# Patient Record
Sex: FEMALE | HISPANIC OR LATINO | Employment: FULL TIME | URBAN - METROPOLITAN AREA
[De-identification: names, ages, dates, MRNs, and addresses within clinical notes are randomized per-mention and may not be internally consistent; named-entity substitution may affect disease eponyms.]

---

## 2021-05-31 ENCOUNTER — OFFICE VISIT (OUTPATIENT)
Dept: URGENT CARE | Facility: URGENT CARE | Age: 26
End: 2021-05-31
Payer: MEDICAID

## 2021-05-31 VITALS
DIASTOLIC BLOOD PRESSURE: 64 MMHG | TEMPERATURE: 99 F | HEART RATE: 103 BPM | WEIGHT: 143 LBS | RESPIRATION RATE: 16 BRPM | SYSTOLIC BLOOD PRESSURE: 125 MMHG | OXYGEN SATURATION: 100 %

## 2021-05-31 DIAGNOSIS — Z11.3 SCREEN FOR STD (SEXUALLY TRANSMITTED DISEASE): Primary | ICD-10-CM

## 2021-05-31 PROCEDURE — 99000 SPECIMEN HANDLING OFFICE-LAB: CPT | Performed by: PHYSICIAN ASSISTANT

## 2021-05-31 PROCEDURE — 36415 COLL VENOUS BLD VENIPUNCTURE: CPT | Performed by: PHYSICIAN ASSISTANT

## 2021-05-31 PROCEDURE — 87591 N.GONORRHOEAE DNA AMP PROB: CPT | Performed by: PHYSICIAN ASSISTANT

## 2021-05-31 PROCEDURE — 87389 HIV-1 AG W/HIV-1&-2 AB AG IA: CPT | Performed by: PHYSICIAN ASSISTANT

## 2021-05-31 PROCEDURE — 87491 CHLMYD TRACH DNA AMP PROBE: CPT | Performed by: PHYSICIAN ASSISTANT

## 2021-05-31 PROCEDURE — 86803 HEPATITIS C AB TEST: CPT | Performed by: PHYSICIAN ASSISTANT

## 2021-05-31 PROCEDURE — 99203 OFFICE O/P NEW LOW 30 MIN: CPT | Performed by: PHYSICIAN ASSISTANT

## 2021-05-31 PROCEDURE — 86706 HEP B SURFACE ANTIBODY: CPT | Performed by: PHYSICIAN ASSISTANT

## 2021-05-31 PROCEDURE — 87340 HEPATITIS B SURFACE AG IA: CPT | Performed by: PHYSICIAN ASSISTANT

## 2021-05-31 PROCEDURE — 86780 TREPONEMA PALLIDUM: CPT | Mod: 90 | Performed by: PHYSICIAN ASSISTANT

## 2021-05-31 RX ORDER — DOXYCYCLINE 100 MG/1
TABLET ORAL
COMMUNITY
Start: 2021-02-08 | End: 2022-02-15

## 2021-05-31 NOTE — PROGRESS NOTES
Patient presents with:  STD: Pt would like STD testing done     (Z11.3) Screen for STD (sexually transmitted disease)  (primary encounter diagnosis)  Comment:   Plan: NEISSERIA GONORRHOEA PCR, CHLAMYDIA TRACHOMATIS        PCR, Hepatitis B surface antigen, Hepatitis B         Surface Antibody, Hepatitis C antibody,         Treponema Abs w Reflex to RPR and Titer, HIV         Antigen Antibody Combo              SUBJECTIVE:   Rainer Muniz is a 25 year old male who presents today for STD screening.  He is asymptomatic.          Current Outpatient Medications   Medication Sig Dispense Refill     Multiple Vitamins-Iron (DAILY-ANDREAS/IRON/BETA-CAROTENE) TABS TAKE 1 TABLET BY MOUTH DAILY. (Patient not taking: Reported on 10/19/2020) 30 tablet 7     Social History     Tobacco Use     Smoking status: Never Smoker     Smokeless tobacco: Never Used   Substance Use Topics     Alcohol use: Not on file     Family History   Problem Relation Age of Onset     Diabetes Mother      Diabetes Father          ROS:    10 point ROS of systems including Constitutional, Eyes, Respiratory, Cardiovascular, Gastroenterology, Genitourinary, Integumentary, Muscularskeletal, Psychiatric ,neurological were all negative except for pertinent positives noted in my HPI       OBJECTIVE:  /64   Pulse 103   Temp 99  F (37.2  C) (Tympanic)   Resp 16   Wt 64.9 kg (143 lb)   SpO2 100%   Physical Exam:  GENERAL APPEARANCE: healthy, alert and no distress  SKIN: no suspicious lesions or rashes

## 2021-06-02 LAB
C TRACH DNA SPEC QL NAA+PROBE: NEGATIVE
HBV SURFACE AB SERPL IA-ACNC: 5.88 M[IU]/ML
HBV SURFACE AG SERPL QL IA: NONREACTIVE
HCV AB SERPL QL IA: ABNORMAL
HIV 1+2 AB+HIV1 P24 AG SERPL QL IA: NONREACTIVE
N GONORRHOEA DNA SPEC QL NAA+PROBE: NEGATIVE
SPECIMEN SOURCE: NORMAL
SPECIMEN SOURCE: NORMAL
T PALLIDUM AB SER QL: NONREACTIVE

## 2021-06-14 ENCOUNTER — IMMUNIZATION (OUTPATIENT)
Dept: FAMILY MEDICINE | Facility: CLINIC | Age: 26
End: 2021-06-14
Payer: MEDICAID

## 2021-06-14 ENCOUNTER — OFFICE VISIT (OUTPATIENT)
Dept: FAMILY MEDICINE | Facility: CLINIC | Age: 26
End: 2021-06-14
Payer: MEDICAID

## 2021-06-14 VITALS
RESPIRATION RATE: 16 BRPM | TEMPERATURE: 97.5 F | DIASTOLIC BLOOD PRESSURE: 64 MMHG | BODY MASS INDEX: 21.52 KG/M2 | OXYGEN SATURATION: 99 % | WEIGHT: 142 LBS | HEART RATE: 87 BPM | HEIGHT: 68 IN | SYSTOLIC BLOOD PRESSURE: 110 MMHG

## 2021-06-14 DIAGNOSIS — Z11.59 NEED FOR HEPATITIS C SCREENING TEST: ICD-10-CM

## 2021-06-14 DIAGNOSIS — F43.23 ADJUSTMENT DISORDER WITH MIXED ANXIETY AND DEPRESSED MOOD: ICD-10-CM

## 2021-06-14 DIAGNOSIS — R76.8 POSITIVE HEPATITIS C ANTIBODY TEST: Primary | ICD-10-CM

## 2021-06-14 PROCEDURE — 87522 HEPATITIS C REVRS TRNSCRPJ: CPT | Performed by: FAMILY MEDICINE

## 2021-06-14 PROCEDURE — 0011A PR COVID VAC MODERNA 100 MCG/0.5 ML IM: CPT

## 2021-06-14 PROCEDURE — 86803 HEPATITIS C AB TEST: CPT | Performed by: FAMILY MEDICINE

## 2021-06-14 PROCEDURE — 96127 BRIEF EMOTIONAL/BEHAV ASSMT: CPT | Performed by: FAMILY MEDICINE

## 2021-06-14 PROCEDURE — 99213 OFFICE O/P EST LOW 20 MIN: CPT | Mod: 25 | Performed by: FAMILY MEDICINE

## 2021-06-14 PROCEDURE — 36415 COLL VENOUS BLD VENIPUNCTURE: CPT | Performed by: FAMILY MEDICINE

## 2021-06-14 PROCEDURE — 91301 PR COVID VAC MODERNA 100 MCG/0.5 ML IM: CPT

## 2021-06-14 SDOH — HEALTH STABILITY: MENTAL HEALTH: HOW OFTEN DO YOU HAVE A DRINK CONTAINING ALCOHOL?: NEVER

## 2021-06-14 ASSESSMENT — PATIENT HEALTH QUESTIONNAIRE - PHQ9
SUM OF ALL RESPONSES TO PHQ QUESTIONS 1-9: 17
SUM OF ALL RESPONSES TO PHQ QUESTIONS 1-9: 17
10. IF YOU CHECKED OFF ANY PROBLEMS, HOW DIFFICULT HAVE THESE PROBLEMS MADE IT FOR YOU TO DO YOUR WORK, TAKE CARE OF THINGS AT HOME, OR GET ALONG WITH OTHER PEOPLE: NOT DIFFICULT AT ALL

## 2021-06-14 ASSESSMENT — MIFFLIN-ST. JEOR: SCORE: 1613.61

## 2021-06-14 NOTE — PATIENT INSTRUCTIONS
Try to return for an adult preventive health visit. It would be a good idea to see how you are doing emotionally, and it would be good to check your fasting cholesterol and glucose level.   Patient Education     Understanding Adjustment Disorders  Most people have stress in their lives, and sometimes you may have more than you can handle. You may find it hard to cope with a stressful event. As a result, you may become anxious and depressed. You might even get sick. These can be symptoms of an adjustment disorder. But you don t have to suffer. Ask your healthcare provider or mental health professional for help.     Symptoms of adjustment disorders  Symptoms of adjustment disorders include:    Hopelessness    Frequent crying    Depressed mood    Trembling or twitching    Fast, pounding, or fluttering heartbeat (palpitations)    Health problems    Withdrawal from social contacts and situations    Anxiety or tension    Sleeping too much or too little  What is an adjustment disorder?  Adjustment disorders sometimes occur when life gets to be too much. They often appear within 3 months of a stressful time. The symptoms vary widely. You might pretend the stressful event never happened. Or you might think about it so much you can t eat or sleep. In most cases, your feelings may seem beyond your control.   What causes it?  The events that trigger an adjustment disorder vary from person to person. Adults may be troubled by work, money, or marriage problems. Teens are often more likely bothered by school or conflict with parents. They also may find it hard to cope with a divorce or sexual issues. The death of a loved one can be especially hard to face for all family members. So can major life changes such as a move. Poverty or a lack of social skills may make matters worse.   What can be done?  Adjustment disorders can almost always be helped by therapy. You may feel relieved just to talk to someone. In some cases, only you  and your therapist will meet. In others, your whole family may be involved. You might also join a group for people with this disorder. The support and concern of others can help you recover more quickly.   Mamadou last reviewed this educational content on 12/1/2019 2000-2021 [employer]. All rights reserved. This information is not intended as a substitute for professional medical care. Always follow your healthcare professional's instructions.           Patient Education     Advance Medical Directive    An advance medical directive is a form that lets you plan ahead for the care you d want if you could no longer express your wishes. This statement outlines the medical treatment you d want or names the person you d wish to make healthcare decisions for you. Be aware that laws vary from state to state, and it may be worthwhile to talk with an .  Writing down your wishes    An advance directive is important whether you re young or old. Injury or illness can strike at any age.    Decide what is important to you and the kind of treatment you d want, or not want to have.    Some states allow only one kind of advance directive. Some let you do both a durable power of  for healthcare and a living will. Some states put both kinds on the same form.  A durable power of  (POA) for healthcare    This form lets you name someone else to be your agent.    This person can decide on treatment for you only when you can t speak for yourself.    You don't need to be at the end of your life. He or she could speak for you if you were in a coma but were likely to recover.  A living will    This form lets you list the care you want at the end of your life.    A living will applies only if you won t live without medical treatment. It would apply if you had advanced cancer, a massive stroke, or other serious illness from which you will not recover.    It takes effect only when you can no longer  express your wishes yourself.  Date Last Reviewed: 3/1/2018    8347-8714 [employer]. [address], [place] [address]. All rights reserved. This information is not intended as a substitute for professional medical care. Always follow your healthcare professional's instructions.

## 2021-06-14 NOTE — LETTER
To whom it may concern:       Patient was seen by me today for exam. I believe patient emotionally benefits from having his cat has a  due to his mental health condition, and is a required, important part of his medical management.           Sincerely,         Rogelio Prince, DO on 6/14/2021 at 2:52 PM

## 2021-06-14 NOTE — PROGRESS NOTES
Assessment & Plan     Adjustment disorder with mixed anxiety and depressed mood  Letter written for patient to give his property management where he is renting, stating that he needs his pet as an emotional support animal.  This will hopefully help him get the pet deposit waived.    Need for hepatitis C screening test    - **Hepatitis C Screen Reflex to RNA FUTURE anytime  - Hepatitis C RNA Quantitative    Positive hepatitis C antibody test  No treatment required with patient not having any detectable hepatitis C RNA.      20 minutes spent on the date of the encounter doing chart review, history and exam, documentation and further activities per the note       Depression Screening Follow Up    PHQ 6/14/2021   PHQ-9 Total Score 17   Q9: Thoughts of better off dead/self-harm past 2 weeks Several days   F/U: Thoughts of suicide or self-harm No   F/U: Safety concerns No         Return in about 3 months (around 9/14/2021) for Routine preventive.    Rogelio Prince DO  [facility] is a 25 year [facility] who presents for the following health issues     History of Present Illness       Mental Health Follow-up:  Patient presents to follow-up on Depression.Patient's depression since last visit has been:  No change  The patient is having other symptoms associated with depression.      Any significant life events: relationship concerns and grief or loss  Patient is not feeling anxious or having panic attacks.  Patient has no concerns about alcohol or drug use.     Social History  Tobacco Use    Smoking status: Not on file  Alcohol use: Not on file  Drug use: Not on file      Today's PHQ-9         PHQ-9 Total Score:     (P) 17   PHQ-9 Q9 Thoughts of better off dead/self-harm past 2 weeks :   (P) Several days   Thoughts of suicide or self harm:  (P) No   Self-harm Plan:        Self-harm Action:          Safety concerns for self or others: (P) No         He eats 2-3 servings of fruits and  vegetables daily.He consumes 0 sweetened beverage(s) daily.He exercises with enough effort to increase his heart rate 60 or more minutes per day.  He exercises with enough effort to increase his heart rate 7 days per week.   He is taking medications regularly.     :[id number]}        Review of Systems   Check in questions and answers reviewed. Patient is seen for chief concern of needing a letter supporting his desire to have an emotional support animal.    Patient had been getting care through [facility], [facility] in \[facility] [place] [facility]".     He moved from [place] to [place] about 5 months ago.     No prior history of mental health care.     He has had a very stressful 3 years he relates.    3 years ago, both parents  3 months apart.     He  her  in September of this year.  He told me how his  gave his cat away while they were living together.    Patient has a cat he would like like to travel with as an emotional support animal.  He got the cat about 3 days before this exam.    Patient has a feeling of emptiness, or no purpose after his parents .     His prior thoughts of suicide have not been present for 2 years.  He has no active plan to harm himself at time of this exam.    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     He has a good local friend for emotional support.     He lives by himself with his cat who he got 3 days ago as a fostered adult cat.     He has some anxiety over managing his money, which his parents did a lot of in the past. He has a lot of student loan dept. He is not struggling financially, being able to meet all of his basic needs currently with the money he is making.  He did not think he needed a referral to care coordination to discuss when concerns at this time.    He prefers no prescriptions or referral for mental health  "management for now.     His middle initial was incorrectly made a \"B\" on former [place] record.  Because of this, I did not have access to his previously done STD screening labs which she showed me on his care everywhere phone cinthia.  His screening labs were significant for his hepatitis C screening test being \"equivocal\".  He was agreeable to repeating this test.          Objective    /64   Pulse 87   Temp 97.5  F (36.4  C) (Tympanic)   Resp 16   Ht 1.727 m (5' 8\")   Wt 64.4 kg (142 lb)   SpO2 99%   BMI 21.59 kg/m    Body mass index is 21.59 kg/m .  Physical Exam   Vital signs reviewed.  Patient is in no acute appearing distress.  Breathing appears nonlabored.  Patient is alert and oriented ×3.  Patient is very pleasant, making good eye contact and responding with clear fluent speech.  No psychomotor agitation during exam.  He answered questions appropriately.    Heart: Heart rate is regular without murmur.    Lungs: Lungs are clear to auscultation with good airflow bilaterally.    Skin: Warm and dry.    Results for orders placed or performed in visit on 06/14/21   **Hepatitis C Screen Reflex to RNA FUTURE anytime     Status: Abnormal   Result Value Ref Range    Hepatitis C Antibody Reactive (A) NR^Nonreactive   Hepatitis C RNA Quantitative     Status: None   Result Value Ref Range    HCV RNA Quant IU/ml HCV RNA Not Detected HCVND^HCV RNA Not Detected [IU]/mL    Log of HCV RNA Qt Not Calculated <1.2 Log IU/mL     I spoke to patient by telephone about his lab results.  I advised him that the lab results suggest that he was infected with hepatitis C sometime in the past and recovered from this illness, with the virus not being noted on the RNA testing.  Advised him that no treatment was needed at this time.  I recommended checking his liver function tests at yearly exams.            Answers for HPI/ROS submitted by the patient on 6/14/2021   Chronic problems general questions HPI Form  If you checked off " any problems, how difficult have these problems made it for you to do your work, take care of things at home, or get along with other people?: Not difficult at all  PHQ9 TOTAL SCORE: 17

## 2021-06-16 LAB
HCV AB SERPL QL IA: REACTIVE
HCV RNA SERPL NAA+PROBE-ACNC: NORMAL [IU]/ML
HCV RNA SERPL NAA+PROBE-LOG IU: NORMAL LOG IU/ML

## 2021-06-27 ENCOUNTER — HEALTH MAINTENANCE LETTER (OUTPATIENT)
Age: 26
End: 2021-06-27

## 2021-08-18 ENCOUNTER — OFFICE VISIT (OUTPATIENT)
Dept: URGENT CARE | Facility: URGENT CARE | Age: 26
End: 2021-08-18
Payer: MEDICAID

## 2021-08-18 VITALS
DIASTOLIC BLOOD PRESSURE: 74 MMHG | OXYGEN SATURATION: 99 % | HEART RATE: 98 BPM | TEMPERATURE: 98.8 F | SYSTOLIC BLOOD PRESSURE: 120 MMHG

## 2021-08-18 DIAGNOSIS — Z11.3 SCREEN FOR STD (SEXUALLY TRANSMITTED DISEASE): Primary | ICD-10-CM

## 2021-08-18 PROCEDURE — 99213 OFFICE O/P EST LOW 20 MIN: CPT | Performed by: PHYSICIAN ASSISTANT

## 2021-08-18 PROCEDURE — 36415 COLL VENOUS BLD VENIPUNCTURE: CPT | Performed by: PHYSICIAN ASSISTANT

## 2021-08-18 PROCEDURE — 87591 N.GONORRHOEAE DNA AMP PROB: CPT | Performed by: PHYSICIAN ASSISTANT

## 2021-08-18 PROCEDURE — 86780 TREPONEMA PALLIDUM: CPT | Performed by: PHYSICIAN ASSISTANT

## 2021-08-18 PROCEDURE — 87491 CHLMYD TRACH DNA AMP PROBE: CPT | Performed by: PHYSICIAN ASSISTANT

## 2021-08-18 PROCEDURE — 87389 HIV-1 AG W/HIV-1&-2 AB AG IA: CPT | Performed by: PHYSICIAN ASSISTANT

## 2021-08-18 NOTE — PROGRESS NOTES
Assessment & Plan     Screen for STD (sexually transmitted disease)  HIV, syphilis, gonorrhea and Chlamydia are pending.  He will be notified if any abnormal results. He agrees.   - NEISSERIA GONORRHOEA PCR  - CHLAMYDIA TRACHOMATIS PCR  - HIV Antigen Antibody Combo  - Treponema Abs w Reflex to RPR and Titer  - HIV Antigen Antibody Combo  - Treponema Abs w Reflex to RPR and Titer         Return in about 3 months (around 11/18/2021) for Routine Visit.    Julissa Strickland PA-C  [facility] [place] is [place] 25 year old male who presents to clinic today for the following health issues:  Chief Complaint   Patient presents with     Urgent Care     Screening     STD screening-No sx     HPI    Patient is presenting to urgent care today for STD screening.  Had condom break during anal sexual intercourse a week ago with new partner.  He is not currently having any symptoms.  No penile discharge.  No urinary symptoms. No fever or chills.      Review of Systems  Constitutional, HEENT, cardiovascular, pulmonary, GI, , musculoskeletal, neuro, skin, endocrine and psych systems are negative, except as otherwise noted.      Objective    /74   Pulse 98   Temp 98.8  F (37.1  C) (Oral)   SpO2 99%   Physical Exam   GENERAL: healthy, alert and no distress  SKIN: no suspicious lesions or rashes    No results found for this or any previous visit (from the past 24 hour(s)).

## 2021-08-18 NOTE — PATIENT INSTRUCTIONS
Patient Education     What Are Sexually Transmitted Infections (STIs)?   A sexually transmitted infection (STI) is an infection that is spread during sex. An STI can also be called STD for sexually transmitted disease. You can become infected with an STI if you have sex with someone who has an STI. Any sex that involves the penis, vagina, anus, or mouth can spread these infections. Some STIs also spread through body fluids such as semen, vaginal fluid, or blood. Others spread through contact with infected skin. The most common STIs are chlamydia, genital warts , genital herpes, syphilis, HIV, gonorrhea, and trichomoniasis.   Who is at risk?  It doesn t matter if you re straight or hopper, male or female, young or old. Any person who has sex can get an STI. Your risk increases if:     You have more than one partner. The more partners you have, the greater your risk.    Your partner has other partners. If your partner is exposed to an STI, you could be, too.    You or your partner have had sex with other people in the past. Either of you might be carrying an STI from an earlier partner.    You have an STI. The STI may cause sores or other health problems that increase your risk for new infections. Your risk will stay high unless you are treated for your current STI and change the behaviors that put you at risk.  Prevent future problems  Left untreated, certain STIs can lead to cancer or, rarely, death. Some can harm unborn babies whose mothers are infected. Others can cause you to not be able to have children (sterility) or can affect changes in behavior or your ability to think. You can prevent these problems with safer sex, regular checkups, and early treatment. Always use a latex condom when you have sex. Get tested if you re at risk. And get treated early if you have an STI.      Using a latex condom every time you have sex can reduce your risk of STIs.     Getting checked  The only sure way to know if you have an  STI is to get checked by a healthcare provider. If you notice a change in how your body looks or feels, have it checked out. But keep in mind, STIs don t always show symptoms. So if you re at risk for STIs, get checked regularly. If you find you have an STI, have your partner get treatment. If not, his or her health is at risk. And left untreated, your partner could pass the STI back to you, or on to others.   Common symptoms  Be alert to any changes in your body and your partner s body. Symptoms may appear in or near the vagina, penis, rectum, mouth, or throat. They may include:     Unusual discharge    Lumps, bumps, or rashes    Sores that may be painful, itchy, or painless    Itchy skin    Burning with urination    Pain in the pelvis, belly (abdomen), or rectum    Bleeding from the rectum  Even if you don t have symptoms  You may have an STI even if you don t have symptoms. If you think you are at risk, get checked. Go to a clinic or to your healthcare provider. If your partner has an STI, you need to be tested even if you feel fine.   Vaccines to prevent disease   Vaccines are available to prevent hepatitis A and hepatitis B. These are 2 kinds of STIs. There is also a vaccine to prevent human papillomavirus (HPV). This is a virus that can be passed from person to person through sexual contact. Ask your healthcare provider whether any of these vaccines is right for you.   Mamadou last reviewed this educational content on 12/1/2018 2000-2021 [employer]. All rights reserved. This information is not intended as a substitute for professional medical care. Always follow your healthcare professional's instructions.

## 2021-08-19 LAB — HIV 1+2 AB+HIV1 P24 AG SERPL QL IA: NONREACTIVE

## 2021-08-20 LAB
C TRACH DNA SPEC QL NAA+PROBE: NEGATIVE
N GONORRHOEA DNA SPEC QL NAA+PROBE: NEGATIVE
T PALLIDUM AB SER QL: NONREACTIVE

## 2021-10-17 ENCOUNTER — HEALTH MAINTENANCE LETTER (OUTPATIENT)
Age: 26
End: 2021-10-17

## 2021-10-27 ENCOUNTER — OFFICE VISIT (OUTPATIENT)
Dept: URGENT CARE | Facility: URGENT CARE | Age: 26
End: 2021-10-27
Payer: MEDICAID

## 2021-10-27 VITALS
HEART RATE: 67 BPM | DIASTOLIC BLOOD PRESSURE: 82 MMHG | TEMPERATURE: 98.5 F | SYSTOLIC BLOOD PRESSURE: 126 MMHG | OXYGEN SATURATION: 99 %

## 2021-10-27 DIAGNOSIS — Z11.3 SCREEN FOR STD (SEXUALLY TRANSMITTED DISEASE): Primary | ICD-10-CM

## 2021-10-27 PROCEDURE — 36415 COLL VENOUS BLD VENIPUNCTURE: CPT | Performed by: FAMILY MEDICINE

## 2021-10-27 PROCEDURE — 99213 OFFICE O/P EST LOW 20 MIN: CPT | Performed by: FAMILY MEDICINE

## 2021-10-27 PROCEDURE — 87491 CHLMYD TRACH DNA AMP PROBE: CPT | Performed by: FAMILY MEDICINE

## 2021-10-27 PROCEDURE — 87389 HIV-1 AG W/HIV-1&-2 AB AG IA: CPT | Performed by: FAMILY MEDICINE

## 2021-10-27 PROCEDURE — 87591 N.GONORRHOEAE DNA AMP PROB: CPT | Performed by: FAMILY MEDICINE

## 2021-10-27 PROCEDURE — 86780 TREPONEMA PALLIDUM: CPT | Performed by: FAMILY MEDICINE

## 2021-10-27 NOTE — PROGRESS NOTES
Assessment & Plan     Screen for STD (sexually transmitted disease)    - Neisseria gonorrhoeae PCR  - Chlamydia trachomatis PCR  - HIV Antigen Antibody Combo; Future  - Treponema Abs w Reflex to RPR and Titer; Future  - HIV Antigen Antibody Combo  - Treponema Abs w Reflex to RPR and Titer    Tests pending.  Safe sexual practices discussed.    Donna Saeed MD  [facility] is a 25 year [facility] who presents for the following health issues     HPI     Requests STD screen.  Asymptomatic.      Review of Systems   Constitutional, HEENT, cardiovascular, pulmonary, GI, , musculoskeletal, neuro, skin, endocrine and psych systems are negative, except as otherwise noted.      Objective    /82   Pulse 67   Temp 98.5  F (36.9  C) (Oral)   SpO2 99%   There is no height or weight on file to calculate BMI.  Physical Exam   GENERAL: healthy, alert and no distress  EYES: Eyes grossly normal to inspection, PERRL and conjunctivae and sclerae normal  NECK: no adenopathy, no asymmetry, masses, or scars and thyroid normal to palpation  PSYCH: mentation appears normal, affect normal/bright

## 2021-10-28 LAB
HIV 1+2 AB+HIV1 P24 AG SERPL QL IA: NONREACTIVE
T PALLIDUM AB SER QL: NONREACTIVE

## 2021-10-29 LAB
C TRACH DNA SPEC QL NAA+PROBE: NEGATIVE
N GONORRHOEA DNA SPEC QL NAA+PROBE: NEGATIVE

## 2021-11-23 ENCOUNTER — TELEPHONE (OUTPATIENT)
Dept: FAMILY MEDICINE | Facility: CLINIC | Age: 26
End: 2021-11-23
Payer: MEDICAID

## 2021-11-23 NOTE — TELEPHONE ENCOUNTER
Reason for Call:  Form, our goal is to have forms completed with 72 hours, however, some forms may require a visit or additional information.    Type of letter, form or note:  emotional support animal     Who is the form from?: Apartment complex  (if other please explain)    Where did the form come from: form was faxed in    What clinic location was the form placed at?: [facility]     Where the form was placed: Dr Prince Box/Folder    What number is listed as a contact on the form?: None        Additional comments: please complete, and return to TC to take care of     Call taken on 11/23/2021 at 12:39 PM by Ingris Deluna

## 2022-02-15 ENCOUNTER — OFFICE VISIT (OUTPATIENT)
Dept: URGENT CARE | Facility: URGENT CARE | Age: 27
End: 2022-02-15
Payer: COMMERCIAL

## 2022-02-15 VITALS
HEIGHT: 68 IN | WEIGHT: 145 LBS | DIASTOLIC BLOOD PRESSURE: 66 MMHG | HEART RATE: 89 BPM | SYSTOLIC BLOOD PRESSURE: 118 MMHG | OXYGEN SATURATION: 98 % | RESPIRATION RATE: 16 BRPM | BODY MASS INDEX: 21.98 KG/M2 | TEMPERATURE: 98.3 F

## 2022-02-15 DIAGNOSIS — Z11.3 SCREEN FOR STD (SEXUALLY TRANSMITTED DISEASE): Primary | ICD-10-CM

## 2022-02-15 PROCEDURE — 87591 N.GONORRHOEAE DNA AMP PROB: CPT | Performed by: PHYSICIAN ASSISTANT

## 2022-02-15 PROCEDURE — 36415 COLL VENOUS BLD VENIPUNCTURE: CPT | Performed by: PHYSICIAN ASSISTANT

## 2022-02-15 PROCEDURE — 86780 TREPONEMA PALLIDUM: CPT | Performed by: PHYSICIAN ASSISTANT

## 2022-02-15 PROCEDURE — 87389 HIV-1 AG W/HIV-1&-2 AB AG IA: CPT | Performed by: PHYSICIAN ASSISTANT

## 2022-02-15 PROCEDURE — 87491 CHLMYD TRACH DNA AMP PROBE: CPT | Performed by: PHYSICIAN ASSISTANT

## 2022-02-15 PROCEDURE — 99213 OFFICE O/P EST LOW 20 MIN: CPT | Performed by: PHYSICIAN ASSISTANT

## 2022-02-15 RX ORDER — SPIRONOLACTONE 50 MG/1
150 TABLET, FILM COATED ORAL DAILY
COMMUNITY
Start: 2021-12-07

## 2022-02-15 RX ORDER — ESTRADIOL 2 MG/1
TABLET ORAL
COMMUNITY
Start: 2021-12-07 | End: 2024-01-09

## 2022-02-15 ASSESSMENT — MIFFLIN-ST. JEOR: SCORE: 1622.22

## 2022-02-15 NOTE — PROGRESS NOTES
"  Assessment/Plan:    STD tests ordered; will contact pt if any are abnormal. Otherwise [email or web address]    See patient instructions below.    At the end of the encounter, I discussed results, diagnosis, medications. Discussed red flags for immediate return to clinic/ER, as well as indications for follow up if no improvement. Patient understood and agreed to plan. Patient was stable for discharge.      ICD-10-CM    1. Screen for STD (sexually transmitted disease)  Z11.3 Hepatitis B surface antigen     Treponema Abs w Reflex to RPR and Titer     HIV Antigen Antibody Combo     NEISSERIA GONORRHOEA PCR     CHLAMYDIA TRACHOMATIS PCR     Treponema Abs w Reflex to RPR and Titer     HIV Antigen Antibody Combo         Return in about 1 month (around 3/15/2022) for Routine Visit.    DAPHNE Christian, PAA[facility]haWestbrook [facility] Center  -----------------------------------------------------------------------------------------------------------------------------------------------------    HPI:  Rainer Muniz is a 26 year old adult who presents for STD screening. Pt has no symptoms such as penile discharge, dysuria, genital sores/rashes, abdominal pain. Pt has 1 sexual partner, no specific STD exposures.    History reviewed. No pertinent past medical history.    Vitals:    02/15/22 1559   BP: 118/66   BP Location: Right arm   Patient Position: Chair   Cuff Size: Adult Regular   Pulse: 89   Resp: 16   Temp: 98.3  F (36.8  C)   TempSrc: Oral   SpO2: 98%   Weight: 65.8 kg (145 lb)   Height: 1.727 m (5' 8\")       Physical Exam  Vitals and nursing note reviewed.   Pulmonary:      Effort: Pulmonary effort is normal.   Genitourinary:     Comments:  exam deferred  Neurological:      Mental Status: She is alert.         Labs/Imaging:  No results found for this or any previous visit (from the past 24 hour(s)).      There are no Patient Instructions on file for this visit.      "

## 2022-02-16 LAB
HIV 1+2 AB+HIV1 P24 AG SERPL QL IA: NONREACTIVE
T PALLIDUM AB SER QL: NONREACTIVE

## 2022-02-17 LAB
C TRACH DNA SPEC QL NAA+PROBE: NEGATIVE
N GONORRHOEA DNA SPEC QL NAA+PROBE: NEGATIVE

## 2022-03-25 ENCOUNTER — HOSPITAL ENCOUNTER (EMERGENCY)
Facility: CLINIC | Age: 27
Discharge: HOME | End: 2022-03-25
Attending: EMERGENCY MEDICINE | Admitting: EMERGENCY MEDICINE
Payer: COMMERCIAL

## 2022-03-25 ENCOUNTER — APPOINTMENT (OUTPATIENT)
Dept: ULTRASOUND IMAGING | Facility: CLINIC | Age: 27
End: 2022-03-25
Attending: EMERGENCY MEDICINE
Payer: COMMERCIAL

## 2022-03-25 VITALS
TEMPERATURE: 98.1 F | SYSTOLIC BLOOD PRESSURE: 110 MMHG | WEIGHT: 139.4 LBS | BODY MASS INDEX: 22.4 KG/M2 | DIASTOLIC BLOOD PRESSURE: 76 MMHG | OXYGEN SATURATION: 100 % | RESPIRATION RATE: 20 BRPM | HEART RATE: 83 BPM | HEIGHT: 66 IN

## 2022-03-25 DIAGNOSIS — M79.662 PAIN OF LEFT CALF: ICD-10-CM

## 2022-03-25 PROCEDURE — 99284 EMERGENCY DEPT VISIT MOD MDM: CPT | Mod: 25

## 2022-03-25 PROCEDURE — 93971 EXTREMITY STUDY: CPT | Mod: LT

## 2022-03-25 NOTE — ED TRIAGE NOTES
Patient here with left leg pain which started today.no chest pain and no shortness of breath reported

## 2022-03-25 NOTE — ED PROVIDER NOTES
"  History     Chief Complaint:    Leg Pain       HPI   Rainer Muniz is a 26 year old adult who presents with concern of left lower extremity pain.  The patient awoke with pain in the left calf today.  No trauma.  Never experienced this in the past.  Concerned as the patient is undergoing estrogen treatment.  Currently getting injections of exogenous estrogen.  Denies fever chills or sweats.  Denies chest pain or dyspnea.  Denies feeling lightheaded or sense of palpitations.  No recent long distance travel.  Denies pains elsewhere.  No other complaints.    Allergies:  No Known Allergies     Medications:    estradiol (ESTRACE) 2 MG tablet  spironolactone (ALDACTONE) 50 MG tablet        Past Medical History:    History reviewed. No pertinent past medical history.    There are no problems to display for this patient.       Past Surgical History:    History reviewed. No pertinent surgical history.     Family History:    family history includes Breast Cancer in her mother.    Social History:   reports that she has never smoked. She has never used smokeless tobacco. She reports that she does not drink alcohol and does not use drugs.    PCP: No Ref-Primary, Physician     Review of Systems  All systems otherwise negative or per history of present illness    Physical Exam     Patient Vitals for the past 24 hrs:   BP Temp Temp src Pulse Resp SpO2 Height Weight   03/25/22 2025 -- -- -- -- -- 100 % -- --   03/25/22 2024 110/76 -- -- 83 -- -- -- --   03/25/22 1931 -- -- -- -- -- 98 % -- --   03/25/22 1900 114/71 -- -- 100 -- 99 % -- --   03/25/22 1837 108/60 98.1  F (36.7  C) Oral 84 20 100 % 1.676 m (5' 6\") 63.2 kg (139 lb 6.4 oz)      Physical Exam  SKIN: No erythema or ecchymoses of the left lower extremity.  HEMATOLOGIC/IMMUNOLOGIC/LYMPHATIC:  No pallor or edema of left lower limb.  No lymphangitic streaking of left lower limb.  EYES:  Conjunctivae normal.  CARDIOVASCULAR:  Regular rate and rhythm.  No murmur.  RESPIRATORY:  " No respiratory distress, breath sounds equal and normal.  GASTROINTESTINAL:  Soft, nontender abdomen.  MUSCULOSKELETAL: Painless active range of motion of the left lower limb.  Left lower extremity palpably nontender at the area of pain, the calf.  No palpable mass at the region of pain.  NEUROLOGIC:  Alert, conversant.  PSYCHIATRIC:  Normal mood.    Emergency Department Course     Imaging:  US Lower Extremity Venous Duplex Left   Final Result   IMPRESSION:   1.  No deep venous thrombosis in the left lower extremity.         Interventions:  Medications - No data to display     Emergency Department Course:  Past medical records, nursing notes, and vitals reviewed.  I performed an exam of the patient and obtained history, as documented above.  I rechecked the patient. Findings and plan explained to the patient. Patient was discharged.    Impression & Plan      Medical Decision Making:  This patient presents concerned of atraumatic left lower extremity pain.  The main concern was deep vein thrombus.  Imaging gladly negative.  Advised Tylenol or ibuprofen for pain and follow-up if symptoms persist or if new acute concerns.    Diagnosis:    ICD-10-CM    1. Pain of left calf  M79.662         Discharge Medications:  Discharge Medication List as of 3/25/2022  8:23 PM           3/25/2022   Jamal Mccurdy MD Moe, James Thomas, MD  03/25/22 8941

## 2022-04-13 ENCOUNTER — OFFICE VISIT (OUTPATIENT)
Dept: URGENT CARE | Facility: URGENT CARE | Age: 27
End: 2022-04-13
Payer: OTHER MISCELLANEOUS

## 2022-04-13 ENCOUNTER — NURSE TRIAGE (OUTPATIENT)
Dept: FAMILY MEDICINE | Facility: CLINIC | Age: 27
End: 2022-04-13
Payer: COMMERCIAL

## 2022-04-13 VITALS
HEART RATE: 99 BPM | DIASTOLIC BLOOD PRESSURE: 61 MMHG | SYSTOLIC BLOOD PRESSURE: 101 MMHG | TEMPERATURE: 98.2 F | OXYGEN SATURATION: 98 %

## 2022-04-13 DIAGNOSIS — S86.912A KNEE STRAIN, LEFT, INITIAL ENCOUNTER: Primary | ICD-10-CM

## 2022-04-13 PROCEDURE — 99213 OFFICE O/P EST LOW 20 MIN: CPT | Performed by: FAMILY MEDICINE

## 2022-04-13 RX ORDER — DICLOFENAC SODIUM 75 MG/1
75 TABLET, DELAYED RELEASE ORAL 2 TIMES DAILY
Qty: 20 TABLET | Refills: 0 | Status: SHIPPED | OUTPATIENT
Start: 2022-04-13 | End: 2022-06-17

## 2022-04-13 NOTE — LETTER
[facility]  [address]  [facility] [address]  [phone]        2022    REPORT OF WORK ABILITY    PATIENT DATA  Employee Name:  Kelly Muniz        : 1995   [id number]  Work related injury: YES  Today's date: 2022  Date of injury: 2022    PROVIDER EVALUATION: Please fill in as needed.  Please give copy to employee for employer.  1. Diagnosis: left knee strain  2. Treatment: knee brace, anti-inflammatory medication, referral to sports medicine for follow-up  3. Medication: diclofenac 75 mg twice a day for 7 days  NOTE: When ordering a medication, [place] Rules require Work Comp or WC on prescriptions.  4. Return to work date: today, with the following restrictions:  No lifting more than 10 lbs at a time; no job involving repeated bending at the knees or twisting.  Needs to be able to sit for at least 5 minutes every hour.  These restrictions are in place for the next 10 days.    RESTRICTIONS: Unlimited unless listed.  Restrictions apply to home and leisure also.  If work within restrictions is not available, the employee is totally disabled.    Medical Examiner: Crystal Medina MD      License or registration: [id number]    Next appointment:     CC: Employer, Managed Care Plan/Payor, Patient

## 2022-04-13 NOTE — TELEPHONE ENCOUNTER
"Patient calling and states she works at [employer] and has a W/C injury.  States 4/4/22 let management know about her concern.  Does twisting motion pulling items off belt at work.  (L) knee swells up when goes to work at [employer] and making clicking noises as well and hurts to be on it too long.  No clinic appts available.  Will go to [facility] when open.  Lizbeth Coombs RN       Reason for Disposition    SEVERE pain (e.g., excruciating)    Additional Information    Negative: Major bleeding (actively dripping or spurting) that can't be stopped    Negative: Bullet, stabbed by knife or other serious penetrating wound    Negative: Looks like a dislocated joint (crooked or deformed)    Negative: Serious injury with multiple fractures (broken bones)    Negative: Sounds like a life-threatening emergency to the triager    Negative: Wound looks infected    Negative: Can't stand (bear weight) or walk    Negative: Skin is split open or gaping (length > 1/2 inch or 12 mm)    Negative: Bleeding won't stop after 10 minutes of direct pressure (using correct technique)    Negative: Sounds like a serious injury to the triager    Negative: Dirt in the wound and not removed after 15 minutes of scrubbing    Negative: Looks infected (e.g., spreading redness, pus, red streak)    Answer Assessment - Initial Assessment Questions  1. MECHANISM: \"How did the injury happen?\" (e.g., twisting injury, direct blow)       Twisting injury 4/4/22  2. ONSET: \"When did the injury happen?\" (Minutes or hours ago)       4/4/22  3. LOCATION: \"Where is the injury located?\"       (L) knee  4. APPEARANCE of INJURY: \"What does the injury look like?\"       Swells, clicks and hurts to be on too long  5. SEVERITY: \"Can you put weight on that leg?\" \"Can you walk?\"       States can stand for about 1 hour and then swells and hard to walk  6. SIZE: For cuts, bruises, or swelling, ask: \"How large is it?\" (e.g., inches or centimeters;  entire joint)       Swelling as " "above  7. PAIN: \"Is there pain?\" If so, ask: \"How bad is the pain?\"    (e.g., Scale 1-10; or mild, moderate, severe)      8  8. TETANUS: For any breaks in the skin, ask: \"When was the last tetanus booster?\"      unknown  9. OTHER SYMPTOMS: \"Do you have any other symptoms?\"  (e.g., \"pop\" when knee injured, swelling, locking, buckling)       Clicks, swells, painful  10. PREGNANCY: \"Is there any chance you are pregnant?\" \"When was your last menstrual period?\"        n/a    Protocols used: KNEE INJURY-A-OH      "

## 2022-04-13 NOTE — PROGRESS NOTES
ASSESSMENT:    ICD-10-CM    1. Knee strain, left, initial encounter  S86.912A diclofenac (VOLTAREN) 75 MG EC tablet     Orthopedic  Referral     Possible meniscal injury in addition to soft tissue strain.    PLAN:  Patient Instructions   Diclofenac 75 mg twice daily for left knee pain and swelling related to injury at work.    Follow up with sports medicine in 1-2 weeks.    Follow restrictions described in paperwork at work and at home.    SUBJECTIVE:  Kelly Muniz is a 26 year old [employer] employee who presents to  today with L knee pain that began after an injury sustained while on the job at the "[employer]" facility in [place] on April 4.  The injury occurred around 2:30 am, and the patient states that it was reported to management.  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  There was sharp pain initially.  Since then she has experienced swelling, mostly below the kneecap, that gets worse with activity, especially bending and twisting but also lifting.  She has been wearing a neoprene knee support with patellar cutout that does provide some relief.  Hasn't had locking of the joint but has noticed a lot of clicking.    OBJECTIVE:  /61   Pulse 99   Temp 98.2  F (36.8  C) (Tympanic)   SpO2 98%   GEN: well-appearing, in NAD  EXT: no current swelling in the L knee or any joint effusion, tender over pes anserine bursa as well as patellar tendon and lateral joint line.  ACL, PCL, LCL, and MCL intact.  No posterior fossa swelling or tenderness noted.  Able to fully extend and can comfortably flex to about 90 degrees (beyond is painful).

## 2022-04-13 NOTE — PATIENT INSTRUCTIONS
Diclofenac 75 mg twice daily for left knee pain and swelling related to injury at work.    Follow up with sports medicine in 1-2 weeks.    Follow restrictions described in paperwork at work and at home.

## 2022-04-22 ENCOUNTER — OFFICE VISIT (OUTPATIENT)
Dept: ORTHOPEDICS | Facility: CLINIC | Age: 27
End: 2022-04-22
Payer: OTHER MISCELLANEOUS

## 2022-04-22 ENCOUNTER — TELEPHONE (OUTPATIENT)
Dept: ORTHOPEDICS | Facility: CLINIC | Age: 27
End: 2022-04-22
Payer: COMMERCIAL

## 2022-04-22 ENCOUNTER — ANCILLARY PROCEDURE (OUTPATIENT)
Dept: GENERAL RADIOLOGY | Facility: CLINIC | Age: 27
End: 2022-04-22
Attending: STUDENT IN AN ORGANIZED HEALTH CARE EDUCATION/TRAINING PROGRAM
Payer: OTHER MISCELLANEOUS

## 2022-04-22 VITALS
WEIGHT: 139 LBS | BODY MASS INDEX: 22.34 KG/M2 | HEIGHT: 66 IN | DIASTOLIC BLOOD PRESSURE: 54 MMHG | SYSTOLIC BLOOD PRESSURE: 116 MMHG

## 2022-04-22 DIAGNOSIS — S89.92XA LEFT KNEE INJURY, INITIAL ENCOUNTER: ICD-10-CM

## 2022-04-22 DIAGNOSIS — M25.362 KNEE INSTABILITY, LEFT: ICD-10-CM

## 2022-04-22 DIAGNOSIS — S89.92XA LEFT KNEE INJURY, INITIAL ENCOUNTER: Primary | ICD-10-CM

## 2022-04-22 PROCEDURE — 99204 OFFICE O/P NEW MOD 45 MIN: CPT | Performed by: STUDENT IN AN ORGANIZED HEALTH CARE EDUCATION/TRAINING PROGRAM

## 2022-04-22 PROCEDURE — 73562 X-RAY EXAM OF KNEE 3: CPT | Performed by: RADIOLOGY

## 2022-04-22 NOTE — LETTER
4/22/2022         RE: Rainer Muniz  [address]  [facility] [address]        Dear Colleague,    Thank you for referring your patient, Rainer Muniz, to the [facility] [place]. Please see a copy of my visit note below.    ASSESSMENT & PLAN       1. Left knee injury, initial encounter    2. Knee buckling, left      Kelly Muniz is a 26 year old presenting for evaluation of acute left knee pain following twisting injury occurring at work on 4/4/22 (Work Comp). History, exam and X-ray findings were reviewed today, concerning for possible meniscus injury with persistent swelling, sensation of instability and buckling. Differential includes aggravated patellofemoral maltracking and chondromalacia versus MPFL tear. Remainder of ligamentous structures appears stable with no pain on palpation or with stressing. Reviewed treatment options inclusive of pain control, activity modification, bracing and formal physical therapy. Also reviewed timing of advance imaging (ie MRI).     At this time, will proceed with the following plan:  - Your left MRI has been ordered. You may call [phone] to schedule.   - Formal physical therapy - exercises to include neuromuscular/proprioceptive control and VMO strengthening. Please also include pain-free closed chain/isometric/isotonic strengthening with use of modalities (including kinesioptaping) as needed/deemed appropriate with home exercise prescription.  - Weight bearing as tolerated based on pain with hinged knee brace.  - Continue course of Voltaren 75 mg with food twice daily (breakfast and dinner).  - Tylenol 1000 mg up to 3 times per day as needed for comfort.  - You can try heat versus ice for 10-15 minutes 3-4 times per day.  - Workability form completed. Okay to continue seated work until re-evaluation in 2 weeks.     Please schedule a follow up appointment to see me in 2 weeks, or sooner as needed for persistence or  worsening of pain. You may call our direct clinic number ([phone]) at any time with questions or concerns.    Tiffanie Cheema MD, [facility]    -----    SUBJECTIVE  Kelly Muniz is a/an 26 year old who is seen in consultation at the request of  Crystal Medina M.D. for evaluation of left knee pain. The patient is seen by themselves. Work Comp claim is going through [employer] (clain # [id number]), [employer] (fax [phone], employee ID [id number]).    Onset: Patient describes acute Left knee twisting injury that occurred while she was working at the [facility] in [place], [place] around 2:30am on 4/4/22. She immediately reported the injury to management and is going through Worker's Comp. 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 The knee became swollen over the next few hours. Pain, swelling and popping persisted over the next week despite rest, prompting presentation to Urgent Care on 4/13/22. There, exam was concerning for ligamentous vs meniscus injury. She trialed a course of diclofenac 75 mg BID with little improvement (course ongoing) and was referred here for further management.   Location of Pain: left anterior knee  Rating of Pain at worst: 8/10  Rating of Pain Currently: 8/10  Worsened by: knee flexion, walking, standing, climbing stairs  Better with: elevating knee/leg  Treatments tried: rest/activity avoidance, neoprene knee sleeve with patellar cut-out and epsom salt baths, diclofenac 75mg  Associated symptoms: swelling and feeling of instability, buckling (feel like her knee is about to break in half); no giving out or locking.   Orthopedic history: NO  Relevant surgical history: NO  Social history: Works at [employer]    No past medical history on file.  Social History     Socioeconomic History     Marital status: Single  "  Tobacco Use     Smoking status: Never Smoker     Smokeless tobacco: Never Used   Vaping Use     Vaping Use: Never used   Substance and Sexual Activity     Alcohol use: Never     Drug use: Never     Sexual activity: Not Currently   Social History Narrative    ** Merged History Encounter **              Patient's past medical, surgical, social, and family histories were reviewed today and no changes are noted.    REVIEW OF SYSTEMS:  10 point ROS is negative other than symptoms noted above in HPI, Past Medical History or as stated below  Constitutional: NEGATIVE for fever, chills, change in weight  Skin: NEGATIVE for worrisome rashes, moles or lesions  GI/: NEGATIVE for bowel or bladder changes  Neuro: NEGATIVE for weakness, dizziness or paresthesias    OBJECTIVE:  /54   Ht 1.676 m (5' 6\")   Wt 63 kg (139 lb)   BMI 22.44 kg/m     General: healthy, alert and in no distress  HEENT: no scleral icterus or conjunctival erythema  Skin: no suspicious lesions or rash. No jaundice.  CV: no pedal edema  Resp: normal respiratory effort without conversational dyspnea   Psych: normal mood and affect  Gait: antalgic gait with appropriate coordination and balance  Neuro: Normal light sensory exam of lower extremity  MSK:  LEFT KNEE  Inspection:    Normal alignment  Palpation:    Tender about the lateral patellar facet, medial patellar facet, MPFL, patella tendon, lateral femoral condyle, lateral joint line and medial joint line. Remainder of bony and ligamentous landmarks are nontender.    Small effusion is present    Patellofemoral crepitus is Present  Range of Motion:     +100 extension to 900 flexion limited due to pain   Strength:    Quadriceps 5-/5 and hamstrings 5-/5    Extensor mechanism intact  Special Tests:    Positive: Patellar grind, patellar inhibition, Vanessa's (pain and clunk), bounce    Negative: MCL/valgus stress (0 & 30 deg), LCL/varus stress (0 & 30 deg), Lachman's, anterior drawer, posterior " drawer    Independent visualization of the below image:  Recent Results (from the past 24 hour(s))   XR Knee Standing AP Bilat [place] Bilat Lat Left        KNEE STANDING AP BILAT [place] BILAT LAT LEFT April 22, 2022  Per my review, mild lateral patellar translation and tilt without acute fracture or degenerative changes noted. Awaiting formal Radiology review.               Tiffanie Cheema MD, [facility]        Again, thank you for allowing me to participate in the care of your patient.        Sincerely,        Tiffanie Cheema MD

## 2022-04-22 NOTE — PROGRESS NOTES
ASSESSMENT & PLAN       1. Left knee injury, initial encounter    2. Knee buckling, left      Kelly Muniz is a 26 year old presenting for evaluation of acute left knee pain following twisting injury occurring at work on 4/4/22 (Work Comp). History, exam and X-ray findings were reviewed today, concerning for possible meniscus injury with persistent swelling, sensation of instability and buckling. Differential includes aggravated patellofemoral maltracking and chondromalacia versus MPFL tear. Remainder of ligamentous structures appears stable with no pain on palpation or with stressing. Reviewed treatment options inclusive of pain control, activity modification, bracing and formal physical therapy. Also reviewed timing of advance imaging (ie MRI).     At this time, will proceed with the following plan:  - Your left MRI has been ordered. You may call [phone] to schedule.   - Formal physical therapy - exercises to include neuromuscular/proprioceptive control and VMO strengthening. Please also include pain-free closed chain/isometric/isotonic strengthening with use of modalities (including kinesioptaping) as needed/deemed appropriate with home exercise prescription.  - Weight bearing as tolerated based on pain with hinged knee brace.  - Continue course of Voltaren 75 mg with food twice daily (breakfast and dinner).  - Tylenol 1000 mg up to 3 times per day as needed for comfort.  - You can try heat versus ice for 10-15 minutes 3-4 times per day.  - Workability form completed. Okay to continue seated work until re-evaluation in 2 weeks.     Please schedule a follow up appointment to see me in 2 weeks, or sooner as needed for persistence or worsening of pain. You may call our direct clinic number ([phone]) at any time with questions or concerns.    Tiffanie Cheema MD, [facility]    -----    SUBJECTIVE  Kelly Muniz is a/an 26 year old who is seen in consultation at  the request of  Crystal Medina M.D. for evaluation of left knee pain. The patient is seen by themselves. Work Comp claim is going through [employer] (claim #[id number]), [facility] (fax [phone], employee ID [id number]).    Onset: Patient describes acute Left knee twisting injury that occurred while she was working at the [facility] facility in [place], [place] around 2:30am on 4/4/22. She immediately reported the injury to management and is going through Worker's Comp. 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 The knee became swollen over the next few hours. Pain, swelling and popping persisted over the next week despite rest, prompting presentation to Urgent Care on 4/13/22. There, exam was concerning for ligamentous vs meniscus injury. She trialed a course of diclofenac 75 mg BID with little improvement (course ongoing) and was referred here for further management.   Location of Pain: left anterior knee  Rating of Pain at worst: 8/10  Rating of Pain Currently: 8/10  Worsened by: knee flexion, walking, standing, climbing stairs  Better with: elevating knee/leg  Treatments tried: rest/activity avoidance, neoprene knee sleeve with patellar cut-out and epsom salt baths, diclofenac 75mg  Associated symptoms: swelling and feeling of instability, buckling (feel like her knee is about to break in half); no giving out or locking.   Orthopedic history: NO  Relevant surgical history: NO  Social history: Works at [employer]    No past medical history on file.  Social History     Socioeconomic History     Marital status: Single   Tobacco Use     Smoking status: Never Smoker     Smokeless tobacco: Never Used   Vaping Use     Vaping Use: Never used   Substance and Sexual Activity     Alcohol use: Never     Drug use: Never     Sexual activity: Not Currently   Social History Narrative    ** Merged History Encounter  "**              Patient's past medical, surgical, social, and family histories were reviewed today and no changes are noted.    REVIEW OF SYSTEMS:  10 point ROS is negative other than symptoms noted above in HPI, Past Medical History or as stated below  Constitutional: NEGATIVE for fever, chills, change in weight  Skin: NEGATIVE for worrisome rashes, moles or lesions  GI/: NEGATIVE for bowel or bladder changes  Neuro: NEGATIVE for weakness, dizziness or paresthesias    OBJECTIVE:  /54   Ht 1.676 m (5' 6\")   Wt 63 kg (139 lb)   BMI 22.44 kg/m     General: healthy, alert and in no distress  HEENT: no scleral icterus or conjunctival erythema  Skin: no suspicious lesions or rash. No jaundice.  CV: no pedal edema  Resp: normal respiratory effort without conversational dyspnea   Psych: normal mood and affect  Gait: antalgic gait with appropriate coordination and balance  Neuro: Normal light sensory exam of lower extremity  MSK:  LEFT KNEE  Inspection:    Normal alignment  Palpation:    Tender about the lateral patellar facet, medial patellar facet, MPFL, patella tendon, lateral femoral condyle, lateral joint line and medial joint line. Remainder of bony and ligamentous landmarks are nontender.    Small effusion is present    Patellofemoral crepitus is Present  Range of Motion:     +100 extension to 900 flexion limited due to pain   Strength:    Quadriceps 5-/5 and hamstrings 5-/5    Extensor mechanism intact  Special Tests:    Positive: Patellar grind, patellar inhibition, Vanessa's (pain and clunk), bounce    Negative: MCL/valgus stress (0 & 30 deg), LCL/varus stress (0 & 30 deg), Lachman's, anterior drawer, posterior drawer    Independent visualization of the below image:  Recent Results (from the past 24 hour(s))   XR Knee Standing AP Bilat [place] Bilat Lat Left        KNEE STANDING AP BILAT [place] BILAT LAT LEFT April 22, 2022  Per my review, mild lateral patellar translation and tilt without acute " fracture or degenerative changes noted. Awaiting formal Radiology review.               Tiffanie Cheema MD, CAQSM  [facility]

## 2022-04-22 NOTE — PATIENT INSTRUCTIONS
1. Left knee injury, initial encounter    2. Knee instability, left      Kelly Muniz is a 26 year old presenting for evaluation of acute left knee pain following twisting injury occurring at work on 4/4/22 (Work Comp). History, exam and X-ray findings were reviewed today, concerning for possible meniscus injury with persistent swelling, sensation of instability and buckling. Differential includes aggravated patellofemoral chondromalacia (cartilage wear and maltracking of the kneecap). Remainder of ligamentous structures appears stable with no pain on palpation or with stressing. Reviewed treatment options inclusive of pain control, activity modification, bracing and formal physical therapy. Also reviewed timing of advance imaging (ie MRI).     At this time, will proceed with the following plan:  - Your left MRI has been ordered. You may call [phone] to schedule.   - Formal physical therapy - exercises to include neuromuscular/proprioceptive control and VMO strengthening. Please also include pain-free closed chain/isometric/isotonic strengthening with use of modalities (including kinesioptaping) as needed/deemed appropriate with home exercise prescription.  - Weight bearing as tolerated based on pain with hinged knee brace.  - Continue course of Voltaren 75 mg with food twice daily (breakfast and dinner).  - Tylenol 1000 mg up to 3 times per day as needed for comfort.  - You can try heat versus ice for 10-15 minutes 3-4 times per day.  - Workability form completed. Okay to continue seated work until re-evaluation in 2 weeks.     Please schedule a follow up appointment to see me in 2 weeks, or sooner as needed for persistence or worsening of pain. You may call our direct clinic number ([phone]) at any time with questions or concerns.    Tiffanie Cheema MD, [facility]

## 2022-04-22 NOTE — TELEPHONE ENCOUNTER
Patient was seen today for appt with Dr. Cunningham regarding left knee injury occurring at work on 4/4/22.    Dr. Cunningham provided patient with work letter and patient requested letter be faxed to [employer]. Patient did not have fax number but provided the following information:    : Sarah Abernathyjulio  Phone: [phone]  Claim # : [id number]    Phone call to Sarah at [employer] to request fax number for workability letter. Spoke with representative Savanah who provided the following fax number: [phone]. She said to put attn: Sarah Cathy and claim # on fax.    Workability / Work restrictions letter faxed to information above.    Serene Sharma MBA, ATC

## 2022-04-22 NOTE — LETTER
[facility] CLIN[facility] [place]  [address] [address] [address] [phone]    Rainer Muniz  YOB: 1995    Date of treatment: 4/22/2022.    Onset of condition: 4/4/22  Work related?  YES  Encounter Diagnoses   Name Primary?     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    The patient is Able to return to work.  When the patient returns to work, the following restrictions apply until follow up after her MRI:    Lift/Carry (lbs)  Not at All Occassional (<2.5 hrs) Frequent (2.5-6 hrs) Continuous (6+)   0 - 10 X      11-25 X      26-35 X      36-50 X      51-75 X        Push/Pull (lbs)       0 - 20 X      20-50 X      51 + X        Walk X      Sit   X    Stand  X     Bend >45     X    Kneel/Squat X      Climb (Stairs/ladder) X        Able to Perform    Below Shoulder reach  6+  hrs.  Shoulder Level reach   6+  hrs.  Above Shoulder reach  6+  hrs.  Keyboard  6+  hrs.  Simple grasp/fine manipulation   6+  hrs.  Firm grasp  6+  hrs.    Make critical judgements:  YES  Do you anticipate that this patient will return to full duty within 12 weeks?  YES  Next scheduled provider visit: 2 weeks (MRI review)               Physician/Provider Information-          Tiffanie Cheema MD, CAQSM  [facility]

## 2022-04-28 ENCOUNTER — THERAPY VISIT (OUTPATIENT)
Dept: PHYSICAL THERAPY | Facility: CLINIC | Age: 27
End: 2022-04-28
Attending: STUDENT IN AN ORGANIZED HEALTH CARE EDUCATION/TRAINING PROGRAM
Payer: OTHER MISCELLANEOUS

## 2022-04-28 DIAGNOSIS — M25.362 KNEE INSTABILITY, LEFT: ICD-10-CM

## 2022-04-28 DIAGNOSIS — S89.92XA LEFT KNEE INJURY, INITIAL ENCOUNTER: ICD-10-CM

## 2022-04-28 DIAGNOSIS — M25.562 ACUTE PAIN OF LEFT KNEE: ICD-10-CM

## 2022-04-28 PROCEDURE — 97110 THERAPEUTIC EXERCISES: CPT | Mod: GP | Performed by: PHYSICAL THERAPIST

## 2022-04-28 PROCEDURE — 97161 PT EVAL LOW COMPLEX 20 MIN: CPT | Mod: GP | Performed by: PHYSICAL THERAPIST

## 2022-04-28 ASSESSMENT — ACTIVITIES OF DAILY LIVING (ADL)
GIVING WAY, BUCKLING OR SHIFTING OF KNEE: THE SYMPTOM AFFECTS MY ACTIVITY SEVERELY
WALK: ACTIVITY IS SOMEWHAT DIFFICULT
KNEE_ACTIVITY_OF_DAILY_LIVING_SCORE: 37.14
HOW_WOULD_YOU_RATE_THE_CURRENT_FUNCTION_OF_YOUR_KNEE_DURING_YOUR_USUAL_DAILY_ACTIVITIES_ON_A_SCALE_FROM_0_TO_100_WITH_100_BEING_YOUR_LEVEL_OF_KNEE_FUNCTION_PRIOR_TO_YOUR_INJURY_AND_0_BEING_THE_INABILITY_TO_PERFORM_ANY_OF_YOUR_USUAL_DAILY_ACTIVITIES?: 47
KNEEL ON THE FRONT OF YOUR KNEE: ACTIVITY IS VERY DIFFICULT
HOW_WOULD_YOU_RATE_THE_OVERALL_FUNCTION_OF_YOUR_KNEE_DURING_YOUR_USUAL_DAILY_ACTIVITIES?: NORMAL
KNEE_ACTIVITY_OF_DAILY_LIVING_SUM: 26
LIMPING: THE SYMPTOM AFFECTS MY ACTIVITY MODERATELY
GO DOWN STAIRS: ACTIVITY IS VERY DIFFICULT
RISE FROM A CHAIR: ACTIVITY IS SOMEWHAT DIFFICULT
GO UP STAIRS: ACTIVITY IS VERY DIFFICULT
STIFFNESS: THE SYMPTOM AFFECTS MY ACTIVITY MODERATELY
SQUAT: ACTIVITY IS FAIRLY DIFFICULT
PAIN: THE SYMPTOM AFFECTS MY ACTIVITY SEVERELY
SIT WITH YOUR KNEE BENT: ACTIVITY IS VERY DIFFICULT
STAND: ACTIVITY IS NOT DIFFICULT
AS_A_RESULT_OF_YOUR_KNEE_INJURY,_HOW_WOULD_YOU_RATE_YOUR_CURRENT_LEVEL_OF_DAILY_ACTIVITY?: ABNORMAL
WEAKNESS: THE SYMPTOM AFFECTS MY ACTIVITY MODERATELY
RAW_SCORE: 26
SWELLING: THE SYMPTOM AFFECTS MY ACTIVITY SEVERELY

## 2022-04-28 NOTE — PROGRESS NOTES
"Physical Therapy Initial Evaluation  Subjective:  The history is provided by the patient. No  was used.   Patient Health History  Rainer Muniz being seen for PHyscial therapy for knee .     Problem began: 4/4/2022.   Problem occurred: work injury at [employer]    Pain is reported as 8/10 on pain scale.  General health as reported by patient is excellent.  Pertinent medical history includes: none.   Red flags:  None as reported by patient.  Medical allergies: none.   Surgeries include:  None.     Other medications details: Estrogen, Spironolactone .    Current occupation is  at [employer] .   Primary job tasks include:  Lifting/carrying, prolonged standing, repetitive tasks and pushing/pulling.                  Therapist Generated HPI Evaluation  Problem details: Pt reports to therapy with a chief complaint of L knee pain from a work related injury. On 4/4/22 they were on a line working in the [employer] that involved a lot of quick repetitive turning. They report that the knee became sore and swollen after this. She then had to work on the same line a few days later and things became very painful. Xray was negative, but the patient's pain remains intense int he knee and it does not feel \"stable.\" She reports that wearing the brace that the doctor gave them has been helpful. They report that they are currently not working due to not being able to due to pain. .         Type of problem:  Left knee.      Condition occurred with:  Repetition/overuse and a twist.  Where condition occurred: at work.  Patient reports pain:  Anterior and in the joint.  Pain is described as aching and sharp and is constant.  Pain is the same all the time.  Since onset symptoms are unchanged.  Associated symptoms:  Loss of motion/stiffness, loss of strength, edema and catching. Symptoms are exacerbated by walking, running, sitting, weight bearing, descending stairs, ascending stairs, kneeling, standing, " bending/squatting and transfers  and relieved by rest and ice.  Special tests included:  X-ray.    Restrictions due to condition include:  Currently not working due to present treatment.  Barriers include:  None as reported by patient.                        Objective:    Gait:  Pt wears don al brace on L knee. Shortened stride, antalgic gait, lacking TKE.   Assistive Devices:  Brace                                                        Knee Evaluation:  ROM:    AROM      Extension:  Left: lacking 15 deg in supine limited by pain and apprehension     Right:  0 deg   Flexion: Left: 90 deg P+    Right: >120 deg         Strength:     Extension:  Left: 3+/5    Pain:+      Right: 5/5   Pain:  Flexion:  Left:/5   Pain:+      Right: 5/5   Pain:    Quad Set Left: Poor    Pain:   Quad Set Right: Good    Pain:    Special Tests: Special test for knee: Special testing difficult to perform. Pt guards the knee heavily and does not allow for passive movement of the knee to test for ligaments. No joint line tenderness.   Left knee positive for the following special tests:  Patellar Compression    Palpation:    Left knee tenderness present at:  Biceps Femoral; Patellar Medial; Patellar Lateral; Patellar Superior and Patellar Inferior        Functional Testing:          Quad:    Single Leg Squat:  Left:      Right:        Bilateral Leg Squat:  Limited use of L LE during sit<>stand transitions                General     ROS  Evaluation limited due to patient's intense pain. Pt very apprehensive to perform active or passive movement of the involved leg. Tenderness throughout patella (inferior, medial and lateral) most pronounced. Significantly lacking extension and flexion ROM of the knee, heavy guarding.     Assessment/Plan:    Patient is a 26 year old adult with left side knee complaints.    Patient has the following significant findings with corresponding treatment plan.                Diagnosis 1:  Left Knee Pain  Pain -   hot/cold therapy, US, electric stimulation, manual therapy, splint/taping/bracing/orthotics and home program  Decreased ROM/flexibility - manual therapy and therapeutic exercise  Decreased joint mobility - manual therapy and therapeutic exercise  Decreased strength - therapeutic exercise and therapeutic activities  Inflammation - cold therapy, US and electric stimulation  Impaired gait - gait training  Impaired muscle performance - neuro re-education  Decreased function - therapeutic activities    Cumulative Therapy Evaluation is: Low complexity.    Previous and current functional limitations:  (See Goal Flow Sheet for this information)    Short term and Long term goals: (See Goal Flow Sheet for this information)     Communication ability:  Patient appears to be able to clearly communicate and understand verbal and written communication and follow directions correctly.  Treatment Explanation - The following has been discussed with the patient:   RX ordered/plan of care  Anticipated outcomes  Possible risks and side effects  This patient would benefit from PT intervention to resume normal activities.   Rehab potential is good.    Frequency:  1 X week, once daily  Duration:  for 8 weeks  Discharge Plan:  Achieve all LTG.  Independent in home treatment program.    Please refer to the daily flowsheet for treatment today, total treatment time and time spent performing 1:1 timed codes.

## 2022-05-04 ENCOUNTER — HOSPITAL ENCOUNTER (OUTPATIENT)
Dept: MRI IMAGING | Facility: CLINIC | Age: 27
Discharge: HOME | End: 2022-05-04
Attending: STUDENT IN AN ORGANIZED HEALTH CARE EDUCATION/TRAINING PROGRAM | Admitting: STUDENT IN AN ORGANIZED HEALTH CARE EDUCATION/TRAINING PROGRAM
Payer: OTHER MISCELLANEOUS

## 2022-05-04 DIAGNOSIS — S89.92XA LEFT KNEE INJURY, INITIAL ENCOUNTER: ICD-10-CM

## 2022-05-04 DIAGNOSIS — M25.362 KNEE INSTABILITY, LEFT: ICD-10-CM

## 2022-05-04 PROCEDURE — 73721 MRI JNT OF LWR EXTRE W/O DYE: CPT | Mod: LT

## 2022-05-04 PROCEDURE — 73721 MRI JNT OF LWR EXTRE W/O DYE: CPT | Mod: 26 | Performed by: RADIOLOGY

## 2022-05-09 ENCOUNTER — THERAPY VISIT (OUTPATIENT)
Dept: PHYSICAL THERAPY | Facility: CLINIC | Age: 27
End: 2022-05-09
Payer: OTHER MISCELLANEOUS

## 2022-05-09 DIAGNOSIS — M25.562 ACUTE PAIN OF LEFT KNEE: Primary | ICD-10-CM

## 2022-05-09 PROCEDURE — 97110 THERAPEUTIC EXERCISES: CPT | Mod: GP | Performed by: PHYSICAL THERAPIST

## 2022-05-16 ENCOUNTER — THERAPY VISIT (OUTPATIENT)
Dept: PHYSICAL THERAPY | Facility: CLINIC | Age: 27
End: 2022-05-16
Payer: OTHER MISCELLANEOUS

## 2022-05-16 DIAGNOSIS — M25.562 ACUTE PAIN OF LEFT KNEE: Primary | ICD-10-CM

## 2022-05-16 PROCEDURE — 97110 THERAPEUTIC EXERCISES: CPT | Mod: GP | Performed by: PHYSICAL THERAPIST

## 2022-05-16 NOTE — PROGRESS NOTES
PROGRESS  REPORT    Progress reporting period is from  on 4/28/22 to 5/16/22.       SUBJECTIVE   Subjective: The pt reports that the knee continues to feel sore. The hips are also sore from the new exercises for gluteal strengthening. The pt continues to wear knee brace for all mobility. The patient reports that the knee still feels painful and it clicks which causes pain. They are very fearful of injuring the knee. Does not feel ready to return to work that requires prolonged duration standing, twisting,squatting and lifting. Compliant with prescribed HEP.    Current Pain level: 6/10.      Initial Pain level: 8/10.   Changes in function:  Yes (See Goal flowsheet attached for changes in current functional level)  Adverse reaction to treatment or activity: None    OBJECTIVE  Changes noted in objective findings:  Yes,   Objective: knee flexion 85 deg with pain and guarding. extension lacking 10 deg. Significant muscle guarding. Unable to perform much active movement at the knee, and guards heavily with PROM/AAROM. Able to perform quad set and tolerates non weight bearing exercises moderately well. Rocking ROM on bike limited and painful.     ASSESSMENT/PLAN  Updated problem list and treatment plan: Diagnosis 1:  Left Knee Pain   Pain -  hot/cold therapy, electric stimulation, manual therapy, splint/taping/bracing/orthotics and home program  Decreased ROM/flexibility - manual therapy and therapeutic exercise  Decreased joint mobility - manual therapy and therapeutic exercise  Decreased strength - therapeutic exercise and therapeutic activities  Impaired gait - gait training  Impaired muscle performance - neuro re-education  Decreased function - therapeutic activities  STG/LTGs have been met or progress has been made towards goals:  Yes (See Goal flow sheet completed today.)  Assessment of Progress: The patient's condition is improving.  Self Management Plans:  Patient has been instructed in a home treatment  program.  I have re-evaluated this patient and find that the nature, scope, duration and intensity of the therapy is appropriate for the medical condition of the patient.  Kelly continues to require the following intervention to meet STG and LTG's:  PT    Recommendations:  This patient would benefit from continued therapy.     Frequency:  1 X week, once daily  Duration:  for 6 weeks        Please refer to the daily flowsheet for treatment today, total treatment time and time spent performing 1:1 timed codes.

## 2022-05-18 ENCOUNTER — OFFICE VISIT (OUTPATIENT)
Dept: ORTHOPEDICS | Facility: CLINIC | Age: 27
End: 2022-05-18
Payer: OTHER MISCELLANEOUS

## 2022-05-18 VITALS
BODY MASS INDEX: 22.5 KG/M2 | DIASTOLIC BLOOD PRESSURE: 70 MMHG | WEIGHT: 140 LBS | SYSTOLIC BLOOD PRESSURE: 124 MMHG | HEIGHT: 66 IN

## 2022-05-18 DIAGNOSIS — M24.10 DEFECT OF ARTICULAR CARTILAGE: ICD-10-CM

## 2022-05-18 DIAGNOSIS — S89.92XA LEFT KNEE INJURY, INITIAL ENCOUNTER: Primary | ICD-10-CM

## 2022-05-18 PROCEDURE — 99214 OFFICE O/P EST MOD 30 MIN: CPT | Performed by: STUDENT IN AN ORGANIZED HEALTH CARE EDUCATION/TRAINING PROGRAM

## 2022-05-18 NOTE — LETTER
5/18/2022         RE: Kelly Muniz  [address]  [facility] [address]        Dear Colleague,    Thank you for referring your patient, Kelly Muniz, to the [facility] [place]. Please see a copy of my visit note below.    ASSESSMENT & PLAN    1. Left knee injury, subsequent encounter    2. Focal full thickness articular cartilage defect, left lateral femoral condyle      Kelly Muniz is a 26 year old presenting for follow up of acute left knee pain following twisting injury occurring at work on 4/4/22 (Work Comp injury). MRI was recently obtained showing a focal area of full thickness cartilage loss over the anterior weightbearing surface of the lateral femoral condyle. We thoroughly reviewed her imaging and treatment options inclusive of pain control, activity modification, bracing, formal physical therapy and surgical referral. At this time, the lesion appears small and stable. As such, will proceed with a trial of conservative management with protected weighbearing as tolerated and formal physical therapy.     - Continue formal physical therapy with emphasis on regaining full knee range of motion, as well as strengthening. To be fitted with lateral  brace in PT.  - Weight bearing as tolerated based on pain with knee brace. Crutches as needed if walking is painful.  - Continue course of Voltaren 75 mg with food twice daily (breakfast and dinner).  - Tylenol 1000 mg up to 3 times per day as needed for comfort.  - You can try heat versus ice for 10-15 minutes 3-4 times per day.  - Workability form completed. Okay to continue seated work until re-evaluation in 4 weeks.      Please schedule a follow up appointment to see me in 4 weeks (June 13th), or sooner as needed for persistence or worsening of pain. You may call our direct clinic number ([phone]) at any time with questions or concerns.     Tiffanie Cheema MD, [facility]  "Sports and Orthopedic Care    -----    SUBJECTIVE:  Kelly Muniz is a 26 year old adult who is seen in follow-up for left knee pain.They were last seen 4/22/2022.     Since their last visit reports 0% - (About the same as last time). They indicate that their current pain level is 6-7/10. They have tried rest/activity avoidance, Tylenol, Diclofenac 75mg, topical analgesic, home exercises, physical therapy (3 visits), previous imaging (MRI 5/4/22 and xray 4/22/22) and casting/splinting/bracing.      The patient is seen by themselves.    Patient's past medical, surgical, social, and family histories were reviewed today and no changes are noted.    REVIEW OF SYSTEMS:  Constitutional: NEGATIVE for fever, chills, change in weight  Skin: NEGATIVE for worrisome rashes, moles or lesions  GI/: NEGATIVE for bowel or bladder changes  Neuro: NEGATIVE for weakness, dizziness or paresthesias    OBJECTIVE:  /70   Ht 1.676 m (5' 6\")   Wt 63.5 kg (140 lb)   BMI 22.60 kg/m     General: healthy, alert and in no distress  HEENT: no scleral icterus or conjunctival erythema  Skin: no suspicious lesions or rash. No jaundice.  CV: regular rhythm by palpation, no pedal edema  Resp: normal respiratory effort without conversational dyspnea   Psych: normal mood and affect  Gait: antalgic gait with appropriate coordination and balance  Neuro: normal light touch sensory exam of the extremities.    MSK:  LEFT KNEE  Inspection:    Normal alignment  Palpation:    Tender about the lateral femoral condyle, lateral patellar facet, lateral joint line. Remainder of bony and ligamentous landmarks are nontender.    Small effusion is present    Patellofemoral crepitus is Present  Range of Motion:     +100 extension to 900 flexion limited due to pain with significant muscular guarding  Strength:    Quadriceps 5-/5 and hamstrings 5-/5    Extensor mechanism intact    Independent visualization of the below image:  Results for orders placed or " performed during the hospital encounter of 05/04/22   MR Knee Left w/o Contrast    Narrative    MR left knee without contrast 5/4/2022 3:15 PM    Techniques: Multiplanar multisequence imaging of the left knee was  obtained without administration of intra-articular or intravenous  contrast using routine protocol.    History: Knee trauma, meniscal/ligament injury suspected, xray done  (Age >= 1y); Left knee injury, initial encounter; Knee instability,  left    Comparison: Radiographs 4/22/2022    Findings:    MENISCI:  Medial meniscus: Intact.  Lateral meniscus: Intact.    LIGAMENTS  Cruciate ligaments: Intact.  Medial supporting structures: Intact.  Lateral supporting structures: Intact.    EXTENSOR MECHANISM  Intact.    FLUID  No joint effusion. No substantial Baker's cyst.    OSSEOUS and ARTICULAR STRUCTURES  Bones: No fracture, contusion, or osseous lesion is seen.    Patellofemoral compartment: No hyaline cartilage disease.    Medial compartment: No hyaline cartilage disease.    Lateral compartment: Focal area of full-thickness cartilage loss over  the far anterior weightbearing lateral femoral condyle (versus  inferior aspect of the lateral trochlear facet) with subchondral  cystic change. Focus of susceptibility artifact in this region as  well.    ANCILLARY FINDINGS  None.      Impression    Impression:    1. No MR evidence of internal derangement. Intact menisci, cruciate  ligaments, and medial and lateral supporting structures.    2. Focal area of full-thickness chondral fissuring over the far  anterior weightbearing lateral femoral condyle.    MD Tiffanie OSORIO MD (Joe), [facility] Sports and Orthopedic Care              Again, thank you for allowing me to participate in the care of your patient.        Sincerely,        Tiffanie Cheema MD

## 2022-05-18 NOTE — PROGRESS NOTES
ASSESSMENT & PLAN    1. Left knee injury, subsequent encounter    2. Focal full thickness articular cartilage defect, left lateral femoral condyle      Kelly Muniz is a 26 year old presenting for follow up of acute left knee pain following twisting injury occurring at work on 4/4/22 (Work Comp injury). MRI was recently obtained showing a focal area of full thickness cartilage loss over the anterior weightbearing surface of the lateral femoral condyle. We thoroughly reviewed her imaging and treatment options inclusive of pain control, activity modification, bracing, formal physical therapy and surgical referral. At this time, the lesion appears small and stable. As such, will proceed with a trial of conservative management with protected weighbearing as tolerated and formal physical therapy.     - Continue formal physical therapy with emphasis on regaining full knee range of motion, as well as strengthening. To be fitted with lateral  brace in PT.  - Weight bearing as tolerated based on pain with knee brace. Crutches as needed if walking is painful.  - Continue course of Voltaren 75 mg with food twice daily (breakfast and dinner).  - Tylenol 1000 mg up to 3 times per day as needed for comfort.  - You can try heat versus ice for 10-15 minutes 3-4 times per day.  - Workability form completed. Okay to continue seated work until re-evaluation in 4 weeks.      Please schedule a follow up appointment to see me in 4 weeks (June 13th), or sooner as needed for persistence or worsening of pain. You may call our direct clinic number ([phone]) at any time with questions or concerns.     Tiffanie Cheema MD, [facility]    -----    SUBJECTIVE:  Kelly Muniz is a 26 year old adult who is seen in follow-up for left knee pain.They were last seen 4/22/2022.     Since their last visit reports 0% - (About the same as last time). They indicate that their current pain level  "is 6-7/10. They have tried rest/activity avoidance, Tylenol, Diclofenac 75mg, topical analgesic, home exercises, physical therapy (3 visits), previous imaging (MRI 5/4/22 and xray 4/22/22) and casting/splinting/bracing.      The patient is seen by themselves.    Patient's past medical, surgical, social, and family histories were reviewed today and no changes are noted.    REVIEW OF SYSTEMS:  Constitutional: NEGATIVE for fever, chills, change in weight  Skin: NEGATIVE for worrisome rashes, moles or lesions  GI/: NEGATIVE for bowel or bladder changes  Neuro: NEGATIVE for weakness, dizziness or paresthesias    OBJECTIVE:  /70   Ht 1.676 m (5' 6\")   Wt 63.5 kg (140 lb)   BMI 22.60 kg/m     General: healthy, alert and in no distress  HEENT: no scleral icterus or conjunctival erythema  Skin: no suspicious lesions or rash. No jaundice.  CV: regular rhythm by palpation, no pedal edema  Resp: normal respiratory effort without conversational dyspnea   Psych: normal mood and affect  Gait: antalgic gait with appropriate coordination and balance  Neuro: normal light touch sensory exam of the extremities.    MSK:  LEFT KNEE  Inspection:    Normal alignment  Palpation:    Tender about the lateral femoral condyle, lateral patellar facet, lateral joint line. Remainder of bony and ligamentous landmarks are nontender.    Small effusion is present    Patellofemoral crepitus is Present  Range of Motion:     +100 extension to 900 flexion limited due to pain with significant muscular guarding  Strength:    Quadriceps 5-/5 and hamstrings 5-/5    Extensor mechanism intact    Independent visualization of the below image:  Results for orders placed or performed during the hospital encounter of 05/04/22   MR Knee Left w/o Contrast    Narrative    MR left knee without contrast 5/4/2022 3:15 PM    Techniques: Multiplanar multisequence imaging of the left knee was  obtained without administration of intra-articular or " intravenous  contrast using routine protocol.    History: Knee trauma, meniscal/ligament injury suspected, xray done  (Age >= 1y); Left knee injury, initial encounter; Knee instability,  left    Comparison: Radiographs 4/22/2022    Findings:    MENISCI:  Medial meniscus: Intact.  Lateral meniscus: Intact.    LIGAMENTS  Cruciate ligaments: Intact.  Medial supporting structures: Intact.  Lateral supporting structures: Intact.    EXTENSOR MECHANISM  Intact.    FLUID  No joint effusion. No substantial Baker's cyst.    OSSEOUS and ARTICULAR STRUCTURES  Bones: No fracture, contusion, or osseous lesion is seen.    Patellofemoral compartment: No hyaline cartilage disease.    Medial compartment: No hyaline cartilage disease.    Lateral compartment: Focal area of full-thickness cartilage loss over  the far anterior weightbearing lateral femoral condyle (versus  inferior aspect of the lateral trochlear facet) with subchondral  cystic change. Focus of susceptibility artifact in this region as  well.    ANCILLARY FINDINGS  None.      Impression    Impression:    1. No MR evidence of internal derangement. Intact menisci, cruciate  ligaments, and medial and lateral supporting structures.    2. Focal area of full-thickness chondral fissuring over the far  anterior weightbearing lateral femoral condyle.    MD Tiffanie OSORIO MD (Joe), [facility] Sports and Orthopedic Care

## 2022-05-18 NOTE — PATIENT INSTRUCTIONS
1. Left knee injury, subsequent encounter    2. Focal full thickness articular cartilage defect, left lateral femoral condyle      Kelly Muniz is a 26 year old presenting for follow up of acute left knee pain following twisting injury occurring at work on 4/4/22 (Work Comp injury). MRI was recently obtained showing a focal, full thickness cartilage defect over the anterior weightbearing surface of the lateral femoral condyle. We thoroughly reviewed her imaging and treatment options inclusive of pain control, activity modification, bracing, formal physical therapy and surgical referral. At this time, the lesion appears small and stable. As such, will proceed with a trial of conservative management with protected weighbearing as tolerated and formal physical therapy.     - Continue formal physical therapy with focus on gentle range of motion ans strengthening. To be fitted with lateral  brace.  - Weight bearing as tolerated based on pain with knee brace. Crutches as needed if walking is painful.  - Continue course of Voltaren 75 mg with food twice daily (breakfast and dinner).  - Tylenol 1000 mg up to 3 times per day as needed for comfort.  - You can try heat versus ice for 10-15 minutes 3-4 times per day.  - Workability form completed. Okay to continue seated work until re-evaluation in 4 weeks.      Please schedule a follow up appointment to see me in 4 weeks (June 13th), or sooner as needed for persistence or worsening of pain. You may call our direct clinic number ([phone]) at any time with questions or concerns.     Tiffanie Cheema MD, [facility] Sports and Orthopedic [facility]

## 2022-05-18 NOTE — LETTER
[facility] SPORTS MEDICINE CLINIC [place]  [address] [address] [address] [phone]    Rainer Muniz  YOB: 1995    Date of treatment: 05/18/2022.    Onset of condition: 04/04/22  Work related?  YES  Encounter Diagnoses   Name Primary?     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    An MRI of the knee demonstrates a focal area of full-thickness cartilage loss within the knee, which appears to be the cause of her symptoms.     The patient is being treated with physical therapy,  bracing and activity modifications.   She is able to return to work with the following restrictions until follow up on Lucinda 15, 2022:    Lift/Carry (lbs)  Not at All Occassional (<2.5 hrs) Frequent (2.5-6 hrs) Continuous (6+)   0 - 10 X      11-25 X      26-35 X      36-50 X      51-75 X        Push/Pull (lbs)       0 - 20 X      20-50 X      51 + X        Walk X      Sit   X    Stand  X     Bend >45     X    Kneel/Squat X      Climb (Stairs/ladder) X        Able to Perform    Below Shoulder reach  6+  hrs.  Shoulder Level reach   6+  hrs.  Above Shoulder reach  6+  hrs.  Keyboard  6+  hrs.  Simple grasp/fine manipulation   6+  hrs.  Firm grasp  6+  hrs.    Make critical judgements:  YES  Do you anticipate that this patient will return to full duty within 12 weeks?  YES  Next scheduled provider visit: Approximately 4 weeks (Lucinda 15, 2022)               Physician/Provider Information-          Tiffanie Cheema MD, CAQSM  [facility]

## 2022-05-23 ENCOUNTER — THERAPY VISIT (OUTPATIENT)
Dept: PHYSICAL THERAPY | Facility: CLINIC | Age: 27
End: 2022-05-23
Payer: OTHER MISCELLANEOUS

## 2022-05-23 DIAGNOSIS — M25.562 ACUTE PAIN OF LEFT KNEE: Primary | ICD-10-CM

## 2022-05-23 PROCEDURE — 97110 THERAPEUTIC EXERCISES: CPT | Mod: GP | Performed by: PHYSICAL THERAPIST

## 2022-05-23 PROCEDURE — 97140 MANUAL THERAPY 1/> REGIONS: CPT | Mod: GP | Performed by: PHYSICAL THERAPIST

## 2022-05-31 ENCOUNTER — THERAPY VISIT (OUTPATIENT)
Dept: PHYSICAL THERAPY | Facility: CLINIC | Age: 27
End: 2022-05-31
Payer: OTHER MISCELLANEOUS

## 2022-05-31 DIAGNOSIS — M25.562 ACUTE PAIN OF LEFT KNEE: Primary | ICD-10-CM

## 2022-05-31 PROCEDURE — 97110 THERAPEUTIC EXERCISES: CPT | Mod: GP | Performed by: PHYSICAL THERAPIST

## 2022-05-31 PROCEDURE — 97140 MANUAL THERAPY 1/> REGIONS: CPT | Mod: GP | Performed by: PHYSICAL THERAPIST

## 2022-06-06 ENCOUNTER — THERAPY VISIT (OUTPATIENT)
Dept: PHYSICAL THERAPY | Facility: CLINIC | Age: 27
End: 2022-06-06
Payer: OTHER MISCELLANEOUS

## 2022-06-06 DIAGNOSIS — M25.562 ACUTE PAIN OF LEFT KNEE: Primary | ICD-10-CM

## 2022-06-06 PROCEDURE — 97110 THERAPEUTIC EXERCISES: CPT | Mod: GP | Performed by: PHYSICAL THERAPIST

## 2022-06-13 ENCOUNTER — THERAPY VISIT (OUTPATIENT)
Dept: PHYSICAL THERAPY | Facility: CLINIC | Age: 27
End: 2022-06-13
Payer: OTHER MISCELLANEOUS

## 2022-06-13 DIAGNOSIS — M25.562 ACUTE PAIN OF LEFT KNEE: Primary | ICD-10-CM

## 2022-06-13 PROCEDURE — 97140 MANUAL THERAPY 1/> REGIONS: CPT | Mod: GP | Performed by: PHYSICAL THERAPIST

## 2022-06-13 PROCEDURE — 97110 THERAPEUTIC EXERCISES: CPT | Mod: GP | Performed by: PHYSICAL THERAPIST

## 2022-06-13 NOTE — PROGRESS NOTES
PROGRESS  REPORT    Progress reporting period is from 5/16/22 to 6/13/22.       SUBJECTIVE  Subjective: The patient has attended 7 visits of PT for treatment of her L knee injury from a work related injury. She reports minimal improvement in her knee pain and her ability to walk. She reports that she does not feel ready to return to work.  She does have a lateral  brace that she finds was mildly helpful. Pt returns to see Dr. Cunningham this week for a follow up. Pt reports that she is hoping she does not go back to her same job due to fear of reinjury. Semi-compliant with HEP.       Current Pain level: 6/10.      Initial Pain level: 8/10.   Changes in function:  Yes (See Goal flowsheet attached for changes in current functional level)  Adverse reaction to treatment or activity: None    OBJECTIVE  Changes noted in objective findings:  Yes  Objective: Knee flexion: 50 deg limited by pain when on Nustep, extension lacking 10 deg limited by pain. Able to achieve 0 deg of extension in prone following manual therapy to involved hamstring and calf. PROM Knee flexion: 75 deg in prone with significant muscle guarding.  Patient continues to demonstrate significant guarding and apprehension to movement. Noticeable muscle twitching that prevents ease with ROM exercises. AROM, PROM, AAROM, strengthening mild tolerance. Unable to perform SLR without assistance.     ASSESSMENT/PLAN  Updated problem list and treatment plan: Diagnosis 1:  L knee Pain  Pain -  hot/cold therapy, US, electric stimulation, manual therapy, splint/taping/bracing/orthotics and home program  Decreased ROM/flexibility - manual therapy and therapeutic exercise  Decreased strength - therapeutic exercise and therapeutic activities  Inflammation - cold therapy, US and electric stimulation  Impaired gait - gait training  Impaired muscle performance - neuro re-education  Decreased function - therapeutic activities  STG/LTGs have been met or progress has  been made towards goals:  Yes (See Goal flow sheet completed today.)  Assessment of Progress: The patient's condition is improving, but very gradually.   Self Management Plans:  Patient has been instructed in a home treatment program.  I have re-evaluated this patient and find that the nature, scope, duration and intensity of the therapy is appropriate for the medical condition of the patient.  Kelly continues to require the following intervention to meet STG and LTG's:  PT    Recommendations:  This patient would benefit from continued therapy.     Frequency:  1 X week, once daily  Duration:  for 8 weeks        Please refer to the daily flowsheet for treatment today, total treatment time and time spent performing 1:1 timed codes.

## 2022-06-17 ENCOUNTER — OFFICE VISIT (OUTPATIENT)
Dept: ORTHOPEDICS | Facility: CLINIC | Age: 27
End: 2022-06-17
Payer: OTHER MISCELLANEOUS

## 2022-06-17 VITALS
DIASTOLIC BLOOD PRESSURE: 66 MMHG | BODY MASS INDEX: 22.5 KG/M2 | WEIGHT: 140 LBS | SYSTOLIC BLOOD PRESSURE: 118 MMHG | HEIGHT: 66 IN

## 2022-06-17 DIAGNOSIS — M24.10 DEFECT OF ARTICULAR CARTILAGE: ICD-10-CM

## 2022-06-17 DIAGNOSIS — S89.92XA LEFT KNEE INJURY, INITIAL ENCOUNTER: Primary | ICD-10-CM

## 2022-06-17 DIAGNOSIS — S86.912A KNEE STRAIN, LEFT, INITIAL ENCOUNTER: ICD-10-CM

## 2022-06-17 PROCEDURE — 99214 OFFICE O/P EST MOD 30 MIN: CPT | Performed by: STUDENT IN AN ORGANIZED HEALTH CARE EDUCATION/TRAINING PROGRAM

## 2022-06-17 RX ORDER — DICLOFENAC SODIUM 75 MG/1
75 TABLET, DELAYED RELEASE ORAL 2 TIMES DAILY
Qty: 20 TABLET | Refills: 0 | Status: SHIPPED | OUTPATIENT
Start: 2022-06-17 | End: 2024-01-09

## 2022-06-17 NOTE — LETTER
6/17/2022         RE: Kelly Muniz  [address]  [facility] [address]        Dear Colleague,    Thank you for referring your patient, Kelly Muniz, to the [facility]IC[facility] [place]. Please see a copy of my visit note below.    ASSESSMENT & PLAN    1. Left knee injury, subsequent encounter    2. Focal full thickness articular cartilage defect, left lateral femoral condyle      Kelly Muniz is a 26 year old presenting for follow up of acute left knee pain following twisting injury occurring at work on 4/4/22 (Work Comp injury). MRI showed a focal cartilage defect over the anterior weightbearing surface of the lateral femoral condyle. Noting improvement with the lateral  brace and making slow progress in physical therapy. Reassurance provided today, and encouragement to start gradually advancing activities.      - Continue formal physical therapy with focus on mobility, knee and core strengthening, mechanics and soft tissue modalities.  - Continue roller skating and advancing activities on your own.  - May continue the lateral  brace while weightbearing (walking or standing). Wean out of the brace gradually as symptoms improve.  - Voltaren 75 mg with food twice daily (breakfast and dinner) as needed for pain.  - Tylenol 1000 mg up to 3 times per day as needed for comfort.  - Heat or ice for 10-15 minutes 3-4 times per day as needed based on pain.  - Workability form completed with slight advancement.      Please schedule a follow up appointment to see me in 4 weeks (mid July), or sooner as needed for persistence or worsening of pain. You may call our direct clinic number ([phone]) at any time with questions or concerns.    Tiffanie Cheema MD, [facility]    -----    SUBJECTIVE:  Kelly Muniz is a 26 year old adult who is seen in follow-up for left knee pain.They were last seen 5/18/2022.     Since  "their last visit reports a little bit of improvement. They indicate that their current pain level is 5-6/10. They have tried rest/activity avoidance, Tylenol, home exercises, physical therapy (4 visits since last office visit), previous imaging (MRI 5/4/22, xray 4/22/22, ultrasound 3/25/22), lateral  bracing and work restrictions. She has started roller skating without much pain and which seems to be helping with motion and strength.    The patient is seen by themselves.    Patient's past medical, surgical, social, and family histories were reviewed today and no changes are noted.    REVIEW OF SYSTEMS:  Constitutional: NEGATIVE for fever, chills, change in weight  Skin: NEGATIVE for worrisome rashes, moles or lesions  GI/: NEGATIVE for bowel or bladder changes  Neuro: NEGATIVE for weakness, dizziness or paresthesias    OBJECTIVE:  /66   Ht 1.676 m (5' 6\")   Wt 63.5 kg (140 lb)   BMI 22.60 kg/m     General: healthy, alert and in no distress  HEENT: no scleral icterus or conjunctival erythema  Skin: no suspicious lesions or rash. No jaundice.  CV: regular rhythm by palpation, no pedal edema  Resp: normal respiratory effort without conversational dyspnea   Psych: normal mood and affect  Gait: antlagic gait with appropriate coordination and balance, wearing lateral  bracing  Neuro: normal light touch sensory exam of the extremities.    MSK:  LEFT KNEE  Inspection:    Normal alignment  Palpation:    Tender about the lateral femoral condyle, lateral patellar facet, lateral joint line. Remainder of bony and ligamentous landmarks are nontender.    No effusion is present    Patellofemoral crepitus is Absent  Range of Motion:     Full passive extension, flexion limited to 90 degrees due to pain with significant muscular guarding  Strength:    Quadriceps 5-/5 and hamstrings 5-/5    Gluteus medius 4/5    Extensor mechanism intact      Tiffanie Cheema MD, CAM  [facility]            Again, thank you for allowing me to participate in the care of your patient.        Sincerely,        Tiffanie Cheema MD

## 2022-06-17 NOTE — LETTER
Kelly Muniz  YOB: 1995    Date of treatment: 06/17/2022.    Onset of condition: 04/04/22  Work related?  YES  Encounter Diagnoses   Name Primary?     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    An MRI of the knee demonstrates a focal area of full-thickness cartilage loss within the knee, which appears to be the cause of her symptoms.     The patient is being treated with physical therapy,  bracing and activity modifications.   She is able to return to work with the following restrictions while wearing her knee brace until follow up on July 15, 2022:    Lift/Carry (lbs)  Not at All Occassional (<2.5 hrs) Frequent (2.5-6 hrs) Continuous (6+)   0 - 10 X      11-25 X      26-35 X      36-50 X      51-75 X        Push/Pull (lbs)       0 - 20 X      20-50 X      51 + X        Walk  X     Sit   X    Stand  X     Bend >45     X    Kneel/Squat X      Climb (Stairs/ladder) X        Able to Perform    Below Shoulder reach  6+  hrs.  Shoulder Level reach   6+  hrs.  Above Shoulder reach  6+  hrs.  Keyboard  6+  hrs.  Simple grasp/fine manipulation   6+  hrs.  Firm grasp  6+  hrs.    Make critical judgements:  YES  Do you anticipate that this patient will return to full duty within 12 weeks?  YES  Next scheduled provider visit: Approximately 4 weeks (July 15, 2022)               Physician/Provider Information-          Tiffanie Cheema MD, [facility]

## 2022-06-17 NOTE — PATIENT INSTRUCTIONS
1. Left knee injury, subsequent encounter    2. Focal full thickness articular cartilage defect, left lateral femoral condyle      Kelly Muniz is a 26 year old presenting for follow up of acute left knee pain following twisting injury occurring at work on 4/4/22 (Work Comp injury). MRI showed a focal cartilage defect over the anterior weightbearing surface of the lateral femoral condyle. Noting improvement with the lateral  brace and making slow progress in physical therapy. Reassurance provided today, and encouragement to start gradually advancing activities.      - Continue formal physical therapy with focus on mobility, knee and core strengthening, mechanics and soft tissue modalities.  - Continue roller skating and advancing activities on your own.  - May continue the lateral  brace while weightbearing (walking or standing). Wean out of the brace gradually as symptoms improve.  - Voltaren 75 mg with food twice daily (breakfast and dinner) as needed for pain.  - Tylenol 1000 mg up to 3 times per day as needed for comfort.  - Heat or ice for 10-15 minutes 3-4 times per day as needed based on pain.  - Workability form completed with slight advancement.      Please schedule a follow up appointment to see me in 4 weeks (mid July), or sooner as needed for persistence or worsening of pain. You may call our direct clinic number ([phone]) at any time with questions or concerns.    Tiffanie Cheema MD, [facility]

## 2022-06-17 NOTE — PROGRESS NOTES
ASSESSMENT & PLAN    1. Left knee injury, subsequent encounter    2. Focal full thickness articular cartilage defect, left lateral femoral condyle      Kelly Muniz is a 26 year old presenting for follow up of acute left knee pain following twisting injury occurring at work on 4/4/22 (Work Comp injury). MRI showed a focal cartilage defect over the anterior weightbearing surface of the lateral femoral condyle. Noting improvement with the lateral  brace and making slow progress in physical therapy. Reassurance provided today, and encouragement to start gradually advancing activities.      - Continue formal physical therapy with focus on mobility, knee and core strengthening, mechanics and soft tissue modalities.  - Continue roller skating and advancing activities on your own.  - May continue the lateral  brace while weightbearing (walking or standing). Wean out of the brace gradually as symptoms improve.  - Voltaren 75 mg with food twice daily (breakfast and dinner) as needed for pain.  - Tylenol 1000 mg up to 3 times per day as needed for comfort.  - Heat or ice for 10-15 minutes 3-4 times per day as needed based on pain.  - Workability form completed with slight advancement.      Please schedule a follow up appointment to see me in 4 weeks (mid July), or sooner as needed for persistence or worsening of pain. You may call our direct clinic number ([phone]) at any time with questions or concerns.    Tiffanie Cheema MD, [facility]    -----    SUBJECTIVE:  Kelly Muniz is a 26 year old adult who is seen in follow-up for left knee pain.They were last seen 5/18/2022.     Since their last visit reports a little bit of improvement. They indicate that their current pain level is 5-6/10. They have tried rest/activity avoidance, Tylenol, home exercises, physical therapy (4 visits since last office visit), previous imaging (MRI 5/4/22, xray 4/22/22,  "ultrasound 3/25/22), lateral  bracing and work restrictions. She has started roller skating without much pain and which seems to be helping with motion and strength.    The patient is seen by themselves.    Patient's past medical, surgical, social, and family histories were reviewed today and no changes are noted.    REVIEW OF SYSTEMS:  Constitutional: NEGATIVE for fever, chills, change in weight  Skin: NEGATIVE for worrisome rashes, moles or lesions  GI/: NEGATIVE for bowel or bladder changes  Neuro: NEGATIVE for weakness, dizziness or paresthesias    OBJECTIVE:  /66   Ht 1.676 m (5' 6\")   Wt 63.5 kg (140 lb)   BMI 22.60 kg/m     General: healthy, alert and in no distress  HEENT: no scleral icterus or conjunctival erythema  Skin: no suspicious lesions or rash. No jaundice.  CV: regular rhythm by palpation, no pedal edema  Resp: normal respiratory effort without conversational dyspnea   Psych: normal mood and affect  Gait: antlagic gait with appropriate coordination and balance, wearing lateral  bracing  Neuro: normal light touch sensory exam of the extremities.    MSK:  LEFT KNEE  Inspection:    Normal alignment  Palpation:    Tender about the lateral femoral condyle, lateral patellar facet, lateral joint line. Remainder of bony and ligamentous landmarks are nontender.    No effusion is present    Patellofemoral crepitus is Absent  Range of Motion:     Full passive extension, flexion limited to 90 degrees due to pain with significant muscular guarding  Strength:    Quadriceps 5-/5 and hamstrings 5-/5    Gluteus medius 4/5    Extensor mechanism intact      Tiffanie Cheema MD, [facility]        "

## 2022-06-17 NOTE — LETTER
[facility] ORTHOPEDIC CLINIC [facility] SPORTS MEDICINE CLINIC [place]  [address] [address] [address] [phone]    Rainer Muniz  YOB: 1995    Date of treatment: 06/17/2022.    Onset of condition: 04/04/22  Work related?  YES  Encounter Diagnoses   Name Primary?     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    An MRI of the knee demonstrates a focal area of full-thickness cartilage loss within the knee, which appears to be the cause of her symptoms.     The patient is being treated with physical therapy,  bracing and activity modifications.   She is able to return to work with the following restrictions while wearing her knee brace until follow up on July 15, 2022:    Lift/Carry (lbs)  Not at All Occassional (<2.5 hrs) Frequent (2.5-6 hrs) Continuous (6+)   0 - 10 X      11-25 X      26-35 X      36-50 X      51-75 X        Push/Pull (lbs)       0 - 20 X      20-50 X      51 + X        Walk  X     Sit   X    Stand  X     Bend >45     X    Kneel/Squat X      Climb (Stairs/ladder) X        Able to Perform    Below Shoulder reach  6+  hrs.  Shoulder Level reach   6+  hrs.  Above Shoulder reach  6+  hrs.  Keyboard  6+  hrs.  Simple grasp/fine manipulation   6+  hrs.  Firm grasp  6+  hrs.    Make critical judgements:  YES  Do you anticipate that this patient will return to full duty within 12 weeks?  YES  Next scheduled provider visit: Approximately 4 weeks (July 15, 2022)               Physician/Provider Information-          Tiffanie Cheema MD, [facility]

## 2022-06-27 ENCOUNTER — THERAPY VISIT (OUTPATIENT)
Dept: PHYSICAL THERAPY | Facility: CLINIC | Age: 27
End: 2022-06-27
Payer: OTHER MISCELLANEOUS

## 2022-06-27 DIAGNOSIS — M25.562 ACUTE PAIN OF LEFT KNEE: Primary | ICD-10-CM

## 2022-06-27 PROCEDURE — 97110 THERAPEUTIC EXERCISES: CPT | Mod: GP

## 2022-06-27 NOTE — PROGRESS NOTES
"PROGRESS REPORT    Kelly has been in therapy from April 28, 2022 to Jun 27, 2022 for treatment of L knee injury and instability.    Therapist Impression:  Patient has made improved with ROM, gait, strength and function. Today demonstrated great improvements with knee flexion and function of step ups. She is able to walk and roller blade just fine, but is concerned about returning to work.     Subjective:  Saw MD,  her knee got better, continue with PT and follow up in July. Patient reports her knee will not fully straighten, posterior knee feels like it pulls. Pain increases with walking and standing > 30 mins. Continues to swell with prolonged activity. Has been doing HEP 3x/week. Has been going on walks and rollerblading fine. States she cannot return to her previous work since she is \"bound to get hurt again due to poor working conditions\" and had many questions regarding if treatment stops working or work place refuses to pay.     Objective:  L knee ROM: 8-80 limited by anterior pain, some knee tremors with ROM and ex. Less apprehension with movement today.    Step up to 6in step with R UE on barre, pain <2/10.     ASSESSMENT/PLAN  Updated problem list and treatment plan: The encounter diagnosis was Acute pain of left knee. Pain - manual, self management, HEP  Decreased ROM/flexibility - there ex, HEP  Decreased function - there act, HEP  Decreased strength - there ex, HEP  Impaired muscle performance - neuro re-ed, HEP  Impaired gait - gait training, HEP  STG/LTGs have been met or progress has been made towards goals:  Yes (See Goal flow sheet completed today.)  Assessment of Progress: The patient's condition is improving.  The patient's condition has potential to improve.  Self Management Plans:  Patient has been instructed in a home treatment program.  I have re-evaluated this patient and find that the nature, scope, duration and intensity of the therapy is appropriate for the medical condition of " the patient.  Kelly continues to require the following intervention to meet STG and LTG's:  PT    Recommendations:  This patient would benefit from continued therapy.     Frequency:  1 X week, once daily  Duration:  for 4 weeks tapering to 2 X a month x 1 month          Please refer to the daily flowsheet for treatment today, total treatment time and time spent performing 1:1 timed codes.

## 2022-07-06 ENCOUNTER — THERAPY VISIT (OUTPATIENT)
Dept: PHYSICAL THERAPY | Facility: CLINIC | Age: 27
End: 2022-07-06
Payer: OTHER MISCELLANEOUS

## 2022-07-06 DIAGNOSIS — M25.562 ACUTE PAIN OF LEFT KNEE: Primary | ICD-10-CM

## 2022-07-06 PROCEDURE — 97140 MANUAL THERAPY 1/> REGIONS: CPT | Mod: GP | Performed by: PHYSICAL THERAPIST

## 2022-07-06 PROCEDURE — 97110 THERAPEUTIC EXERCISES: CPT | Mod: GP | Performed by: PHYSICAL THERAPIST

## 2022-07-11 ENCOUNTER — TELEPHONE (OUTPATIENT)
Dept: UROLOGY | Facility: CLINIC | Age: 27
End: 2022-07-11

## 2022-07-11 NOTE — TELEPHONE ENCOUNTER
[facility] Call Center    Phone Message    May a detailed message be left on voicemail: yes     Reason for Call: Other: . pt is calling to schedule a consult for vaginoplasty, Kelly is self referred, please call pt to schedule a consult, thank you     Action Taken: Message routed to:  [facility]): uro    Travel Screening: Not Applicable

## 2022-07-12 NOTE — TELEPHONE ENCOUNTER
Writer sent BRAIN message as pt is active on BRAIN. If pt does not read msg, writer to call pt.     Hayley Couch

## 2022-07-13 ENCOUNTER — THERAPY VISIT (OUTPATIENT)
Dept: PHYSICAL THERAPY | Facility: CLINIC | Age: 27
End: 2022-07-13
Payer: OTHER MISCELLANEOUS

## 2022-07-13 DIAGNOSIS — M25.562 ACUTE PAIN OF LEFT KNEE: Primary | ICD-10-CM

## 2022-07-13 PROCEDURE — 97110 THERAPEUTIC EXERCISES: CPT | Mod: GP | Performed by: PHYSICAL THERAPIST

## 2022-07-13 PROCEDURE — 97140 MANUAL THERAPY 1/> REGIONS: CPT | Mod: GP | Performed by: PHYSICAL THERAPIST

## 2022-07-14 NOTE — PROGRESS NOTES
"PROGRESS  REPORT    Progress reporting period is from 6/28/22 to 7/14/22.       SUBJECTIVE  Subjective: The patient reports that she is feeling more pain and more discomfort this week, but she is not sure what caused the pain. Reports feeling sore for 1-2 days after her more recent PT session. Overall the patient reports that there are a lot of things going on at the moment in her life that are causing stress. Has been rollerblading with her brace on and still feels like this feels ok. Reports that the knee \"just hurts.\"     Current Pain level: 4/10.      Initial Pain level: 8/10.   Changes in function:  Yes (See Goal flowsheet attached for changes in current functional level)  Adverse reaction to treatment or activity: None    OBJECTIVE  Changes noted in objective findings:  Yes  Objective: 97 deg knee flexion today, 0 deg of extension in prone, but when supine lacks 10-15 deg-improved from previous. SIgnificant muscles guarding, muscle shaking duinrg exercises. Able to complete full revolutions on bike-also improved from previous, but patient experiences pain with all activities.     ASSESSMENT/PLAN  Updated problem list and treatment plan: Diagnosis 1:  Left Knee Pain  Pain -  hot/cold therapy, electric stimulation, manual therapy, splint/taping/bracing/orthotics and home program  Decreased ROM/flexibility - manual therapy and therapeutic exercise  Decreased strength - therapeutic exercise and therapeutic activities  Impaired balance - neuro re-education and therapeutic activities  Inflammation - cold therapy, US and electric stimulation  Impaired gait - gait training  Impaired muscle performance - neuro re-education  STG/LTGs have been met or progress has been made towards goals:  Yes (See Goal flow sheet completed today.)  Assessment of Progress: The patient's condition has potential to improve.  The patient's progress has slowed.  Self Management Plans:  Patient has been instructed in a home treatment " program.  I have re-evaluated this patient and find that the nature, scope, duration and intensity of the therapy is appropriate for the medical condition of the patient.  Kelly continues to require the following intervention to meet STG and LTG's:  PT    Recommendations:  This patient would benefit from continued therapy.     Frequency:  1 X week, once daily  Duration:  for 6 weeks        Please refer to the daily flowsheet for treatment today, total treatment time and time spent performing 1:1 timed codes.

## 2022-07-20 ENCOUNTER — THERAPY VISIT (OUTPATIENT)
Dept: PHYSICAL THERAPY | Facility: CLINIC | Age: 27
End: 2022-07-20
Payer: OTHER MISCELLANEOUS

## 2022-07-20 DIAGNOSIS — M25.562 ACUTE PAIN OF LEFT KNEE: Primary | ICD-10-CM

## 2022-07-20 PROCEDURE — 97140 MANUAL THERAPY 1/> REGIONS: CPT | Mod: GP | Performed by: PHYSICAL THERAPIST

## 2022-07-20 PROCEDURE — 97110 THERAPEUTIC EXERCISES: CPT | Mod: GP | Performed by: PHYSICAL THERAPIST

## 2022-07-20 PROCEDURE — 97530 THERAPEUTIC ACTIVITIES: CPT | Mod: GP | Performed by: PHYSICAL THERAPIST

## 2022-07-24 ENCOUNTER — HEALTH MAINTENANCE LETTER (OUTPATIENT)
Age: 27
End: 2022-07-24

## 2022-07-27 ENCOUNTER — OFFICE VISIT (OUTPATIENT)
Dept: ORTHOPEDICS | Facility: CLINIC | Age: 27
End: 2022-07-27
Payer: OTHER MISCELLANEOUS

## 2022-07-27 VITALS
BODY MASS INDEX: 22.5 KG/M2 | SYSTOLIC BLOOD PRESSURE: 118 MMHG | DIASTOLIC BLOOD PRESSURE: 72 MMHG | WEIGHT: 140 LBS | HEIGHT: 66 IN

## 2022-07-27 DIAGNOSIS — S89.92XA LEFT KNEE INJURY, INITIAL ENCOUNTER: Primary | ICD-10-CM

## 2022-07-27 DIAGNOSIS — M24.10 DEFECT OF ARTICULAR CARTILAGE: ICD-10-CM

## 2022-07-27 PROCEDURE — 99213 OFFICE O/P EST LOW 20 MIN: CPT | Performed by: STUDENT IN AN ORGANIZED HEALTH CARE EDUCATION/TRAINING PROGRAM

## 2022-07-27 NOTE — PATIENT INSTRUCTIONS
1. Left knee injury, subsequent encounter    2. Focal full thickness articular cartilage defect, left lateral femoral condyle      Kelly Muniz is a 26 year old presenting for follow up of acute left knee pain following twisting injury occurring at work on 4/4/22 (Work Comp injury). MRI showed a focal cartilage defect over the anterior weightbearing surface of the lateral femoral condyle. Noting improvement with the lateral  brace and making progress in physical therapy. Reassurance provided today, and encouragement to start gradually advancing activities.      - Continue formal physical therapy with focus on mobility, knee and core strengthening, mechanics and soft tissue modalities.  - Continue roller skating and advancing activities gradually on your own.  - May continue the lateral  brace during weightbearing exercise. I would recommend gradually weaning out of the brace during low impact light activity (ie when at home).  - Voltaren 75 mg with food twice daily (breakfast and dinner) as needed for pain.  - Tylenol 1000 mg up to 3 times per day as needed for comfort.  - Heat or ice for 10-15 minutes 3-4 times per day as needed based on pain.  - Workability form completed with advancement.   - We discussed the possibility of proceeding with a hyalronic acid (gel) injection for the knee. We will submit for insurance approval.     Please schedule a follow up appointment to see me in 5 weeks (late August), or sooner as needed for persistence or worsening of pain. You may call our direct clinic number ([phone]) at any time with questions or concerns.    Tiffanie Cheema MD, [facility]

## 2022-07-27 NOTE — PROGRESS NOTES
ASSESSMENT & PLAN    1. Left knee injury, subsequent encounter    2. Focal full thickness articular cartilage defect, left lateral femoral condyle      Kelly Muniz is a 26 year old presenting for follow up of acute left knee pain following twisting injury occurring at work on 4/4/22 (Work Comp injury). MRI showed a focal cartilage defect over the anterior weightbearing surface of the lateral femoral condyle. Noting improvement with the lateral  brace and making progress in physical therapy. Reassurance provided today, and encouragement to start gradually advancing activities.      - Continue formal physical therapy with focus on mobility, knee and core strengthening, mechanics and soft tissue modalities.  - Continue roller skating and advancing activities gradually on your own.  - May continue the lateral  brace during weightbearing exercise. I would recommend gradually weaning out of the brace during low impact light activity (ie when at home).  - Voltaren 75 mg with food twice daily (breakfast and dinner) as needed for pain.  - Tylenol 1000 mg up to 3 times per day as needed for comfort.  - Heat or ice for 10-15 minutes 3-4 times per day as needed based on pain.  - Workability form completed with advancement.   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 She is reluctant to do an injection but is potentially interested if progress stalls in PT. We will submit for insurance approval.     Please schedule a follow up appointment to see me in 5 weeks (late August) as a possible injection (40 minute) appt, or sooner as needed for persistence or worsening of pain. You may call our direct clinic number ([phone]) at any time with questions or concerns.    Tiffanie Cheema MD, CAQSM  [facility]    -----    SUBJECTIVE:  Violet  "RODRIGUEZ Muniz is a 26 year old adult who is seen in follow-up for left knee pain. They were last seen 6/17/2022.     Since their last visit reports 80% improvement. Patient reports she still feels weak. They indicate that their current pain level is 4.5-5/10. They have tried rest/activity avoidance, previous imaging (MRI 5/4/22 and xray 4/22/22, ultrasound 3/25/22), lateral  brace and Tylenol, Diclofenac 75mg (provides some relief), physical therapy (4 visits since last office visit), home exercises, work restrictions.      The patient is seen by themselves.    Patient's past medical, surgical, social, and family histories were reviewed today and no changes are noted.    REVIEW OF SYSTEMS:  Constitutional: NEGATIVE for fever, chills, change in weight  Skin: NEGATIVE for worrisome rashes, moles or lesions  GI/: NEGATIVE for bowel or bladder changes  Neuro: NEGATIVE for weakness, dizziness or paresthesias    OBJECTIVE:  /72   Ht 1.676 m (5' 6\")   Wt 63.5 kg (140 lb)   BMI 22.60 kg/m     General: healthy, alert and in no distress  HEENT: no scleral icterus or conjunctival erythema  Skin: no suspicious lesions or rash. No jaundice.  CV: regular rhythm by palpation, no pedal edema  Resp: normal respiratory effort without conversational dyspnea   Psych: normal mood and affect  Gait: normal steady gait with appropriate coordination and balance  Neuro: normal light touch sensory exam of the extremities.    MSK:  LEFT KNEE  Inspection:    Normal alignment  Palpation:    Tender about the lateral femoral condyle, lateral patellar facet, lateral joint line. Remainder of bony and ligamentous landmarks are nontender.    No effusion is present    Patellofemoral crepitus is Absent  Range of Motion:     Full passive extension, flexion remains limited to 90 degrees by significant muscular guarding  Strength:    Quadriceps 5/5 and hamstrings 5/5    Extensor mechanism intact    MR left knee without contrast 5/4/2022 3:15 " PM     Impression:     1. No MR evidence of internal derangement. Intact menisci, cruciate  ligaments, and medial and lateral supporting structures.     2. Focal area of full-thickness chondral fissuring over the far  anterior weightbearing lateral femoral condyle.     MD Tiffanie OSORIO MD (Joe), [facility]

## 2022-07-27 NOTE — LETTER
Kelly Muniz  YOB: 1995    Date of treatment: 07/27/2022.    Onset of condition: 04/04/22  Work related?  YES  Encounter Diagnoses   Name Primary?     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    An MRI of the knee demonstrates a focal area of full-thickness cartilage loss within the knee, which appears to be the cause of her symptoms.     The patient is being treated with physical therapy,  bracing and activity modifications.   She is able to return to work with the following restrictions while wearing her knee brace until follow up in one month:    Lift/Carry (lbs)  Not at All Occassional (<2.5 hrs) Frequent (2.5-6 hrs) Continuous (6+)   0 - 10    X   11-25   X    26-35  X     36-50 X      51-75 X        Push/Pull (lbs)       0 - 20    X   20-50   X    51 + X        Walk   X    Sit   X    Stand   X    Bend >45     X    Kneel/Squat X      Climb (Stairs/ladder) X        Able to Perform    Below Shoulder reach  6+  hrs.  Shoulder Level reach   6+  hrs.  Above Shoulder reach  6+  hrs.  Keyboard  6+  hrs.  Simple grasp/fine manipulation   6+  hrs.  Firm grasp  6+  hrs.    Make critical judgements:  YES  Do you anticipate that this patient will return to full duty within 12 weeks?  YES  Next scheduled provider visit: Approximately 4 weeks (August 15, 2022)               Physician/Provider Information-          Tiffanie Cheema MD, [facility]

## 2022-07-27 NOTE — LETTER
7/27/2022         RE: Kelly Muniz  [address]  [place] [address]        Dear Colleague,    Thank you for referring your patient, Kelly Munzi, to the [facility] [place]. Please see a copy of my visit note below.    ASSESSMENT & PLAN    1. Left knee injury, subsequent encounter    2. Focal full thickness articular cartilage defect, left lateral femoral condyle      Kelly Muniz is a 26 year old presenting for follow up of acute left knee pain following twisting injury occurring at work on 4/4/22 (Work Comp injury). MRI showed a focal cartilage defect over the anterior weightbearing surface of the lateral femoral condyle. Noting improvement with the lateral  brace and making progress in physical therapy. Reassurance provided today, and encouragement to start gradually advancing activities.      - Continue formal physical therapy with focus on mobility, knee and core strengthening, mechanics and soft tissue modalities.  - Continue roller skating and advancing activities gradually on your own.  - May continue the lateral  brace during weightbearing exercise. I would recommend gradually weaning out of the brace during low impact light activity (ie when at home).  - Voltaren 75 mg with food twice daily (breakfast and dinner) as needed for pain.  - Tylenol 1000 mg up to 3 times per day as needed for comfort.  - Heat or ice for 10-15 minutes 3-4 times per day as needed based on pain.  - Workability form completed with advancement.   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 She is reluctant to do an injection but is potentially interested if progress stalls in PT. We will submit for insurance approval.     Please schedule a follow up appointment to see me in 5 weeks (late August) as a possible injection (40  "minute) appt, or sooner as needed for persistence or worsening of pain. You may call our direct clinic number ([phone]) at any time with questions or concerns.    Tiffanie Cheema MD, [facility]    -----    SUBJECTIVE:  Kelly Muniz is a 26 year old adult who is seen in follow-up for left knee pain. They were last seen 6/17/2022.     Since their last visit reports 80% improvement. Patient reports she still feels weak. They indicate that their current pain level is 4.5-5/10. They have tried rest/activity avoidance, previous imaging (MRI 5/4/22 and xray 4/22/22, ultrasound 3/25/22), lateral  brace and Tylenol, Diclofenac 75mg (provides some relief), physical therapy (4 visits since last office visit), home exercises, work restrictions.      The patient is seen by themselves.    Patient's past medical, surgical, social, and family histories were reviewed today and no changes are noted.    REVIEW OF SYSTEMS:  Constitutional: NEGATIVE for fever, chills, change in weight  Skin: NEGATIVE for worrisome rashes, moles or lesions  GI/: NEGATIVE for bowel or bladder changes  Neuro: NEGATIVE for weakness, dizziness or paresthesias    OBJECTIVE:  /72   Ht 1.676 m (5' 6\")   Wt 63.5 kg (140 lb)   BMI 22.60 kg/m     General: healthy, alert and in no distress  HEENT: no scleral icterus or conjunctival erythema  Skin: no suspicious lesions or rash. No jaundice.  CV: regular rhythm by palpation, no pedal edema  Resp: normal respiratory effort without conversational dyspnea   Psych: normal mood and affect  Gait: normal steady gait with appropriate coordination and balance  Neuro: normal light touch sensory exam of the extremities.    MSK:  LEFT KNEE  Inspection:    Normal alignment  Palpation:    Tender about the lateral femoral condyle, lateral patellar facet, lateral joint line. Remainder of bony and ligamentous landmarks are nontender.    No effusion is " present    Patellofemoral crepitus is Absent  Range of Motion:     Full passive extension, flexion remains limited to 90 degrees by significant muscular guarding  Strength:    Quadriceps 5/5 and hamstrings 5/5    Extensor mechanism intact    MR left knee without contrast 5/4/2022 3:15 PM     Impression:     1. No MR evidence of internal derangement. Intact menisci, cruciate  ligaments, and medial and lateral supporting structures.     2. Focal area of full-thickness chondral fissuring over the far  anterior weightbearing lateral femoral condyle.     MD Tiffanie OSORIO MD (Joe), [facility]              Again, thank you for allowing me to participate in the care of your patient.        Sincerely,        Tiffanie Cheema MD

## 2022-08-04 ENCOUNTER — MYC MEDICAL ADVICE (OUTPATIENT)
Dept: ORTHOPEDICS | Facility: CLINIC | Age: 27
End: 2022-08-04

## 2022-08-04 ENCOUNTER — THERAPY VISIT (OUTPATIENT)
Dept: PHYSICAL THERAPY | Facility: CLINIC | Age: 27
End: 2022-08-04
Payer: OTHER MISCELLANEOUS

## 2022-08-04 DIAGNOSIS — M25.562 ACUTE PAIN OF LEFT KNEE: Primary | ICD-10-CM

## 2022-08-04 PROCEDURE — 97140 MANUAL THERAPY 1/> REGIONS: CPT | Mod: GP | Performed by: PHYSICAL THERAPIST

## 2022-08-04 PROCEDURE — 97110 THERAPEUTIC EXERCISES: CPT | Mod: GP | Performed by: PHYSICAL THERAPIST

## 2022-08-04 NOTE — TELEPHONE ENCOUNTER
Please see patient's MyChart message and advise on updated work letter.    Serene Sharma MBA, ATC

## 2022-08-04 NOTE — LETTER
Kelly Muniz  YOB: 1995    Date of treatment: 07/27/2022.    Onset of condition: 04/04/22  Work related?  YES  Encounter Diagnoses   Name Primary?     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    An MRI of the knee demonstrates a focal area of full-thickness cartilage loss within the knee, which appears to be the cause of her symptoms.     The patient is being treated with physical therapy,  bracing and activity modifications.   She is able to return to work with the following restrictions while wearing her knee brace until follow up on September 2, 2022:    Lift/Carry (lbs)  Not at All Occassional (<2.5 hrs) Frequent (2.5-6 hrs) Continuous (6+)   0 - 10   X    11-25  X     26-35 X      36-50 X      51-75 X        Push/Pull (lbs)       0 - 20  X     20-50 X      51 + X        Walk X      Sit    X   Stand X      Bend >45     X    Kneel/Squat X      Climb (Stairs/ladder) X        Able to Perform    Below Shoulder reach  6+  hrs.  Shoulder Level reach   6+  hrs.  Above Shoulder reach  6+  hrs.  Keyboard  6+  hrs.  Simple grasp/fine manipulation   6+  hrs.  Firm grasp  6+  hrs.    Make critical judgements:  YES  Do you anticipate that this patient will return to full duty within 12 weeks?  YES  Next scheduled provider visit: Approximately 4 weeks (9/2/22)               Physician/Provider Information-          Tiffanie Cheema MD, [facility]

## 2022-08-05 NOTE — TELEPHONE ENCOUNTER
Please advise the patient I have updated/increased her work restrictions. A new workability form should be available for her in [facility]. Please let me know if she was looking for something different.    Tiffanie Cheema MD, [facility]

## 2022-08-10 ENCOUNTER — THERAPY VISIT (OUTPATIENT)
Dept: PHYSICAL THERAPY | Facility: CLINIC | Age: 27
End: 2022-08-10
Payer: OTHER MISCELLANEOUS

## 2022-08-10 DIAGNOSIS — M25.562 ACUTE PAIN OF LEFT KNEE: Primary | ICD-10-CM

## 2022-08-10 PROBLEM — M22.42 CHONDROMALACIA OF PATELLA, LEFT: Status: ACTIVE | Noted: 2022-08-10

## 2022-08-10 PROCEDURE — 97140 MANUAL THERAPY 1/> REGIONS: CPT | Mod: GP | Performed by: PHYSICAL THERAPIST

## 2022-08-10 PROCEDURE — 97116 GAIT TRAINING THERAPY: CPT | Mod: GP | Performed by: PHYSICAL THERAPIST

## 2022-08-10 PROCEDURE — 97110 THERAPEUTIC EXERCISES: CPT | Mod: GP | Performed by: PHYSICAL THERAPIST

## 2022-08-10 NOTE — TELEPHONE ENCOUNTER
Reason for Call:  Form, our goal is to have forms completed with 7 days, however, some forms may require a visit or additional information.    Type of letter, form or note:  Physician Statement     Who is the form from?: Employer - [employer]    Where did the form come from: Patient or family brought in       Phone number of person requesting form: [phone] (patient)  Can we leave a detailed message on this number:  NO    Desired completion date of form: 8/18/22      How will form be returned?:  Will discuss with patient    Has the patient signed a consent form for release of information (may be included with form)? NO - injury is WC related    Form was started and place in Provider Basket for provider review/ completion at [facility].     Serene Sharma MBA, ATC

## 2022-08-10 NOTE — TELEPHONE ENCOUNTER
Form completed, signed and placed in provider out-basket to be faxed.    Tiffanie Cheema MD, CAQSM  [facility]'

## 2022-08-11 NOTE — TELEPHONE ENCOUNTER
Patient stopped by clinic and obtained copy of completed forms.   Patient signed CHRISTA attached.     Forms faxed to [employer], and sent to scan.   Fax confirmed via [employer].     Daysi Huizar ATC

## 2022-08-17 DIAGNOSIS — M24.10 DEFECT OF ARTICULAR CARTILAGE: ICD-10-CM

## 2022-08-17 DIAGNOSIS — S89.92XA LEFT KNEE INJURY, INITIAL ENCOUNTER: Primary | ICD-10-CM

## 2022-08-18 ENCOUNTER — THERAPY VISIT (OUTPATIENT)
Dept: PHYSICAL THERAPY | Facility: CLINIC | Age: 27
End: 2022-08-18
Payer: OTHER MISCELLANEOUS

## 2022-08-18 DIAGNOSIS — M25.562 ACUTE PAIN OF LEFT KNEE: Primary | ICD-10-CM

## 2022-08-18 PROCEDURE — 97110 THERAPEUTIC EXERCISES: CPT | Mod: GP | Performed by: PHYSICAL THERAPIST

## 2022-08-18 PROCEDURE — 97116 GAIT TRAINING THERAPY: CPT | Mod: GP | Performed by: PHYSICAL THERAPIST

## 2022-08-18 PROCEDURE — 97140 MANUAL THERAPY 1/> REGIONS: CPT | Mod: GP | Performed by: PHYSICAL THERAPIST

## 2022-08-18 ASSESSMENT — ACTIVITIES OF DAILY LIVING (ADL)
STIFFNESS: THE SYMPTOM AFFECTS MY ACTIVITY MODERATELY
RAW_SCORE: 20
PAIN: THE SYMPTOM AFFECTS MY ACTIVITY SEVERELY
SIT WITH YOUR KNEE BENT: ACTIVITY IS VERY DIFFICULT
GO UP STAIRS: ACTIVITY IS VERY DIFFICULT
RISE FROM A CHAIR: ACTIVITY IS FAIRLY DIFFICULT
GO DOWN STAIRS: ACTIVITY IS VERY DIFFICULT
KNEE_ACTIVITY_OF_DAILY_LIVING_SCORE: 28.57
HOW_WOULD_YOU_RATE_THE_OVERALL_FUNCTION_OF_YOUR_KNEE_DURING_YOUR_USUAL_DAILY_ACTIVITIES?: ABNORMAL
WEAKNESS: THE SYMPTOM AFFECTS MY ACTIVITY SEVERELY
KNEE_ACTIVITY_OF_DAILY_LIVING_SUM: 20
WALK: ACTIVITY IS FAIRLY DIFFICULT
SWELLING: THE SYMPTOM PREVENTS ME FROM ALL DAILY ACTIVITIES
STAND: ACTIVITY IS MINIMALLY DIFFICULT
KNEEL ON THE FRONT OF YOUR KNEE: I AM UNABLE TO DO THE ACTIVITY
GIVING WAY, BUCKLING OR SHIFTING OF KNEE: THE SYMPTOM AFFECTS MY ACTIVITY MODERATELY
SQUAT: ACTIVITY IS VERY DIFFICULT
AS_A_RESULT_OF_YOUR_KNEE_INJURY,_HOW_WOULD_YOU_RATE_YOUR_CURRENT_LEVEL_OF_DAILY_ACTIVITY?: ABNORMAL
HOW_WOULD_YOU_RATE_THE_CURRENT_FUNCTION_OF_YOUR_KNEE_DURING_YOUR_USUAL_DAILY_ACTIVITIES_ON_A_SCALE_FROM_0_TO_100_WITH_100_BEING_YOUR_LEVEL_OF_KNEE_FUNCTION_PRIOR_TO_YOUR_INJURY_AND_0_BEING_THE_INABILITY_TO_PERFORM_ANY_OF_YOUR_USUAL_DAILY_ACTIVITIES?: 67
LIMPING: THE SYMPTOM AFFECTS MY ACTIVITY MODERATELY

## 2022-08-25 ENCOUNTER — THERAPY VISIT (OUTPATIENT)
Dept: PHYSICAL THERAPY | Facility: CLINIC | Age: 27
End: 2022-08-25
Payer: OTHER MISCELLANEOUS

## 2022-08-25 DIAGNOSIS — M25.562 ACUTE PAIN OF LEFT KNEE: Primary | ICD-10-CM

## 2022-08-25 PROCEDURE — 97140 MANUAL THERAPY 1/> REGIONS: CPT | Mod: GP | Performed by: PHYSICAL THERAPIST

## 2022-08-25 PROCEDURE — 97110 THERAPEUTIC EXERCISES: CPT | Mod: GP | Performed by: PHYSICAL THERAPIST

## 2022-09-01 ENCOUNTER — MYC MEDICAL ADVICE (OUTPATIENT)
Dept: ORTHOPEDICS | Facility: CLINIC | Age: 27
End: 2022-09-01

## 2022-09-01 NOTE — TELEPHONE ENCOUNTER
Patient is scheduled with Dr. Yu on 9/2/22 for f/u left knee injury.    Discussed with Dr. Yu who recommends it would be best for patient to follow up with Dr. Cunningham when she returns to clinic next week. This would be best for continuity of care / treatment.    Sent patient [place] message to discuss / assist with rescheduling.    Serene Sharma MBA, ATC

## 2022-09-01 NOTE — TELEPHONE ENCOUNTER
Reason for Call:  Form, our goal is to have forms completed with 7 days, however, some forms may require a visit or additional information.    Type of letter, form or note:  FMLA / employer    Who is the form from?: [employer]    Where did the form come from: Patient or family brought in       Phone number of person requesting form: [phone]  Can we leave a detailed message on this number:  NO    Desired completion date of form: 9/2/22      How will form be returned?:  fax to [phone], picked up in clinic by patient    Has the patient signed a consent form for release of information (may be included with form)? NO    Form was started and place in Provider Basket for provider review/ completion at [facility].

## 2022-09-02 ENCOUNTER — OFFICE VISIT (OUTPATIENT)
Dept: ORTHOPEDICS | Facility: CLINIC | Age: 27
End: 2022-09-02
Payer: OTHER MISCELLANEOUS

## 2022-09-02 DIAGNOSIS — M62.838 MUSCLE SPASM: Primary | ICD-10-CM

## 2022-09-02 DIAGNOSIS — G89.29 CHRONIC PAIN OF LEFT KNEE: ICD-10-CM

## 2022-09-02 DIAGNOSIS — M22.42 CHONDROMALACIA OF LEFT PATELLA: ICD-10-CM

## 2022-09-02 DIAGNOSIS — M25.562 CHRONIC PAIN OF LEFT KNEE: ICD-10-CM

## 2022-09-02 PROCEDURE — 99203 OFFICE O/P NEW LOW 30 MIN: CPT | Performed by: ORTHOPAEDIC SURGERY

## 2022-09-02 NOTE — PROGRESS NOTES
HISTORY OF PRESENT ILLNESS:    Kelly Muniz is a 26 year old adult who is seen in follow-up for left knee pain in the absence of Dr. Cunningham. Patient reports onset of left knee pain on 4/4/22 due to twisting injury while at work at [employer].  Patient was most recently evaluated by Dr. Cunningham on 7/27/22.     Present symptoms: Pain and swelling  Treatments tried to this point:  brace, Physical Therapy, Voltaren 75mg, OTC Tylenol, heat, ice  Orthopedic PMH: none     No past medical history on file. none reported    No past surgical history on file. none reported     Family History   Problem Relation Age of Onset     Breast Cancer Mother        Social History     Socioeconomic History     Marital status: Single     Spouse name: Not on file     Number of children: Not on file     Years of education: Not on file     Highest education level: Not on file   Occupational History     Not on file   Tobacco Use     Smoking status: Never Smoker     Smokeless tobacco: Never Used   Vaping Use     Vaping Use: Never used   Substance and Sexual Activity     Alcohol use: Never     Drug use: Never     Sexual activity: Not Currently   Other Topics Concern     Not on file   Social History Narrative    ** Merged History Encounter **          Social Determinants of Health     Financial Resource Strain: Not on file   Food Insecurity: Not on file   Transportation Needs: Not on file   Physical Activity: Not on file   Stress: Not on file   Social Connections: Not on file   Intimate Partner Violence: Not on file   Housing Stability: Not on file       Current Outpatient Medications   Medication Sig Dispense Refill     diclofenac (VOLTAREN) 75 MG EC tablet Take 1 tablet (75 mg) by mouth 2 times daily 20 tablet 0     estradiol (ESTRACE) 2 MG tablet        spironolactone (ALDACTONE) 50 MG tablet Take 150 mg by mouth daily         No Known Allergies    REVIEW OF SYSTEMS:  CONSTITUTIONAL:  NEGATIVE for fever, chills, change in  weight  INTEGUMENTARY/SKIN:  NEGATIVE for worrisome rashes, moles or lesions  EYES:  NEGATIVE for vision changes or irritation  ENT/MOUTH:  NEGATIVE for ear, mouth and throat problems  RESP:  NEGATIVE for significant cough or SOB  BREAST:  NEGATIVE for masses, tenderness or discharge  CV:  NEGATIVE for chest pain, palpitations or peripheral edema  GI:  NEGATIVE for nausea, abdominal pain, heartburn, or change in bowel habits  :  Negative   MUSCULOSKELETAL:  See HPI above  NEURO:  NEGATIVE for weakness, dizziness or paresthesias  ENDOCRINE:  NEGATIVE for temperature intolerance, skin/hair changes  HEME/ALLERGY/IMMUNE:  NEGATIVE for bleeding problems  PSYCHIATRIC:  NEGATIVE for changes in mood or affect      PHYSICAL EXAM:  There were no vitals taken for this visit.  There is no height or weight on file to calculate BMI.   GENERAL APPEARANCE: healthy, alert and no distress   HEENT: No apparent thyroid megaly. Clear sclera with normal ocular movement  RESPIRATORY: No labored breathing  SKIN: no suspicious lesions or rashes  NEURO: Normal strength and tone, mentation intact and speech normal  VASCULAR: Good pulses, and capillary refill   LYMPH: no lymphadenopathy   PSYCH:  mentation appears normal and affect normal/bright    MUSCULOSKELETAL:  Not in acute distress  She walks with a limp  This is mostly because of flexion posture of the left knee  No gross deformity or effusion noted today on the left knee  Range of motion is significantly limited with continuing flexion posture of 10 to 15 degrees of flexion, left knee    As we try to extend the knee, there is a significant involuntary muscle spasm of the quadriceps  Despite the spasm, it is not particularly tender to touch    Grossly active hip flexion and extension is intact  Reports pain around the patella       ASSESSMENT:  Chronic left knee pain  Minimal chondromalacia femoral trochlea  Muscle spasm, tight quadriceps        PLAN:    I reviewed her situation and  "visualized the MRI scan of the knee.  The only identified pathology is a fissure in the anterolateral cartilage of the femur which is not directly related to her job duties in my opinion.    She does have some issue with her knee but it does not appear to be directly related to work activities.    I pointed out that her pathology noted on MRI scan does not match her work related injury which basically was twisting of the knee in the \"line\".    Regardless of the cause of her issue, it appears that she has involuntary muscle spasm based on today's examination.    She definitely needs to have this further evaluated and I recommended her to see a neurologist for further evaluation unrelated to work on issue.    At this point that she walked out of the room claiming that every issue that she is dealing with was caused by her work.    Before she left the room, I clearly indicated that, in my opinion, her chronic pain and muscle spasm is not directly work-related injury.    She can remain in the same sitting down job work restrictions but it will not be related to the work comp.    Follow-up as needed.      Imaging Interpretation:     Narrative & Impression   MR left knee without contrast 5/4/2022 3:15 PM     Techniques: Multiplanar multisequence imaging of the left knee was  obtained without administration of intra-articular or intravenous  contrast using routine protocol.     History: Knee trauma, meniscal/ligament injury suspected, xray done  (Age >= 1y); Left knee injury, initial encounter; Knee instability,  left     Comparison: Radiographs 4/22/2022     Findings:     MENISCI:  Medial meniscus: Intact.  Lateral meniscus: Intact.     LIGAMENTS  Cruciate ligaments: Intact.  Medial supporting structures: Intact.  Lateral supporting structures: Intact.     EXTENSOR MECHANISM  Intact.     FLUID  No joint effusion. No substantial Baker's cyst.     OSSEOUS and ARTICULAR STRUCTURES  Bones: No fracture, contusion, or osseous " lesion is seen.     Patellofemoral compartment: No hyaline cartilage disease.     Medial compartment: No hyaline cartilage disease.     Lateral compartment: Focal area of full-thickness cartilage loss over  the far anterior weightbearing lateral femoral condyle (versus  inferior aspect of the lateral trochlear facet) with subchondral  cystic change. Focus of susceptibility artifact in this region as  well.     ANCILLARY FINDINGS  None.                                                                      Impression:     1. No MR evidence of internal derangement. Intact menisci, cruciate  ligaments, and medial and lateral supporting structures.     2. Focal area of full-thickness chondral fissuring over the far  anterior weightbearing lateral femoral condyle.     JERMAINE AMARO MD (Joe)         KNEE STANDING AP BILAT SUNRISE BILAT LAT LEFT April 22, 2022 1:36 PM      HISTORY: Left knee pain after injury.     COMPARISON: None.                                                                      IMPRESSION: Normal joint spacing and alignment. No acute fracture or  joint effusion.     MD Carlton CARDENAS MD  Department of Orthopedic Surgery        Disclaimer: This note consists of symbols derived from keyboarding, dictation and/or voice recognition software. As a result, there may be errors in the script that have gone undetected. Please consider this when interpreting information found in this chart.

## 2022-09-02 NOTE — LETTER
9/2/2022         RE: Kelly Muniz  [address]  [facility] [address]        Dear Colleague,    Thank you for referring your patient, Kelly Muniz, to the [facility] [place]. Please see a copy of my visit note below.    HISTORY OF PRESENT ILLNESS:    Kelly Muniz is a 26 year old adult who is seen in follow-up for left knee pain in the absence of Dr. Cunningham. Patient reports onset of left knee pain on 4/4/22 due to twisting injury while at work at [employer].  Patient was most recently evaluated by Dr. Cunningham on 7/27/22.     Present symptoms: Pain and swelling  Treatments tried to this point:  brace, Physical Therapy, Voltaren 75mg, OTC Tylenol, heat, ice  Orthopedic PMH: none     No past medical history on file. none reported    No past surgical history on file. none reported     Family History   Problem Relation Age of Onset     Breast Cancer Mother        Social History     Socioeconomic History     Marital status: Single     Spouse name: Not on file     Number of children: Not on file     Years of education: Not on file     Highest education level: Not on file   Occupational History     Not on file   Tobacco Use     Smoking status: Never Smoker     Smokeless tobacco: Never Used   Vaping Use     Vaping Use: Never used   Substance and Sexual Activity     Alcohol use: Never     Drug use: Never     Sexual activity: Not Currently   Other Topics Concern     Not on file   Social History Narrative    ** Merged History Encounter **          Social Determinants of Health     Financial Resource Strain: Not on file   Food Insecurity: Not on file   Transportation Needs: Not on file   Physical Activity: Not on file   Stress: Not on file   Social Connections: Not on file   Intimate Partner Violence: Not on file   Housing Stability: Not on file       Current Outpatient Medications   Medication Sig Dispense Refill     diclofenac (VOLTAREN) 75 MG EC tablet Take 1 tablet  (75 mg) by mouth 2 times daily 20 tablet 0     estradiol (ESTRACE) 2 MG tablet        spironolactone (ALDACTONE) 50 MG tablet Take 150 mg by mouth daily         No Known Allergies    REVIEW OF SYSTEMS:  CONSTITUTIONAL:  NEGATIVE for fever, chills, change in weight  INTEGUMENTARY/SKIN:  NEGATIVE for worrisome rashes, moles or lesions  EYES:  NEGATIVE for vision changes or irritation  ENT/MOUTH:  NEGATIVE for ear, mouth and throat problems  RESP:  NEGATIVE for significant cough or SOB  BREAST:  NEGATIVE for masses, tenderness or discharge  CV:  NEGATIVE for chest pain, palpitations or peripheral edema  GI:  NEGATIVE for nausea, abdominal pain, heartburn, or change in bowel habits  :  Negative   MUSCULOSKELETAL:  See HPI above  NEURO:  NEGATIVE for weakness, dizziness or paresthesias  ENDOCRINE:  NEGATIVE for temperature intolerance, skin/hair changes  HEME/ALLERGY/IMMUNE:  NEGATIVE for bleeding problems  PSYCHIATRIC:  NEGATIVE for changes in mood or affect      PHYSICAL EXAM:  There were no vitals taken for this visit.  There is no height or weight on file to calculate BMI.   GENERAL APPEARANCE: healthy, alert and no distress   HEENT: No apparent thyroid megaly. Clear sclera with normal ocular movement  RESPIRATORY: No labored breathing  SKIN: no suspicious lesions or rashes  NEURO: Normal strength and tone, mentation intact and speech normal  VASCULAR: Good pulses, and capillary refill   LYMPH: no lymphadenopathy   PSYCH:  mentation appears normal and affect normal/bright    MUSCULOSKELETAL:  Not in acute distress  She walks with a limp  This is mostly because of flexion posture of the left knee  No gross deformity or effusion noted today on the left knee  Range of motion is significantly limited with continuing flexion posture of 10 to 15 degrees of flexion, left knee    As we try to extend the knee, there is a significant involuntary muscle spasm of the quadriceps  Despite the spasm, it is not particularly tender  "to touch    Grossly active hip flexion and extension is intact  Reports pain around the patella       ASSESSMENT:  Chronic left knee pain  Minimal chondromalacia femoral trochlea  Muscle spasm, tight quadriceps        PLAN:    I reviewed her situation and visualized the MRI scan of the knee.  The only identified pathology is a fissure in the anterolateral cartilage of the femur which is not directly related to her job duties in my opinion.    She does have some issue with her knee but it does not appear to be directly related to work activities.    I pointed out that her pathology noted on MRI scan does not match her work related injury which basically was twisting of the knee in the \"line\".    Regardless of the cause of her issue, it appears that she has involuntary muscle spasm based on today's examination.    She definitely needs to have this further evaluated and I recommended her to see a neurologist for further evaluation unrelated to work on issue.    At this point that she walked out of the room claiming that every issue that she is dealing with was caused by her work.    Before she left the room, I clearly indicated that, in my opinion, her chronic pain and muscle spasm is not directly work-related injury.    She can remain in the same sitting down job work restrictions but it will not be related to the work comp.    Follow-up as needed.      Imaging Interpretation:     Narrative & Impression   MR left knee without contrast 5/4/2022 3:15 PM     Techniques: Multiplanar multisequence imaging of the left knee was  obtained without administration of intra-articular or intravenous  contrast using routine protocol.     History: Knee trauma, meniscal/ligament injury suspected, xray done  (Age >= 1y); Left knee injury, initial encounter; Knee instability,  left     Comparison: Radiographs 4/22/2022     Findings:     MENISCI:  Medial meniscus: Intact.  Lateral meniscus: Intact.     LIGAMENTS  Cruciate ligaments: " Intact.  Medial supporting structures: Intact.  Lateral supporting structures: Intact.     EXTENSOR MECHANISM  Intact.     FLUID  No joint effusion. No substantial Baker's cyst.     OSSEOUS and ARTICULAR STRUCTURES  Bones: No fracture, contusion, or osseous lesion is seen.     Patellofemoral compartment: No hyaline cartilage disease.     Medial compartment: No hyaline cartilage disease.     Lateral compartment: Focal area of full-thickness cartilage loss over  the far anterior weightbearing lateral femoral condyle (versus  inferior aspect of the lateral trochlear facet) with subchondral  cystic change. Focus of susceptibility artifact in this region as  well.     ANCILLARY FINDINGS  None.                                                                      Impression:     1. No MR evidence of internal derangement. Intact menisci, cruciate  ligaments, and medial and lateral supporting structures.     2. Focal area of full-thickness chondral fissuring over the far  anterior weightbearing lateral femoral condyle.     JERMAINE (Jacqueline AMARO MD         KNEE STANDING AP BILAT SUNRISE BILAT LAT LEFT April 22, 2022 1:36 PM      HISTORY: Left knee pain after injury.     COMPARISON: None.                                                                      IMPRESSION: Normal joint spacing and alignment. No acute fracture or  joint effusion.     MD Carlton CARDENAS MD  Department of Orthopedic Surgery        Disclaimer: This note consists of symbols derived from keyboarding, dictation and/or voice recognition software. As a result, there may be errors in the script that have gone undetected. Please consider this when interpreting information found in this chart.        Again, thank you for allowing me to participate in the care of your patient.        Sincerely,        Carlton Yu MD

## 2022-09-02 NOTE — LETTER
Kelly Muniz  YOB: 1995    Date of treatment: 09/2/2022.    Onset of condition: 04/04/22  Work related?  YES  Encounter Diagnoses   Name Primary?     Left knee injury, subsequent encounter Yes     Left knee cartilage defect         The patient is being treated with physical therapy,  bracing and activity modifications.   She is able to return to work with the following restrictions while wearing her knee brace until follow up on September 7, 2022 with Dr. Tiffanie Cunningham:     Lift/Carry (lbs)  Not at All Occassional (<2.5 hrs) Frequent (2.5-6 hrs) Continuous (6+)   0 - 10   X    11-25  X     26-35 X      36-50 X      51-75 X        Push/Pull (lbs)       0 - 20  X     20-50 X      51 + X        Walk X      Sit    X   Stand X      Bend >45     X    Kneel/Squat X      Climb (Stairs/ladder) X        Able to Perform    Below Shoulder reach  6+  hrs.  Shoulder Level reach   6+  hrs.  Above Shoulder reach  6+  hrs.  Keyboard  6+  hrs.  Simple grasp/fine manipulation   6+  hrs.  Firm grasp  6+  hrs.    Make critical judgements:  YES  Do you anticipate that this patient will return to full duty within 12 weeks?  YES  Next scheduled provider visit: Approximately 4 weeks (9/2/22)               Physician/Provider Information-           Dr. Carlton Yu  [facility]

## 2022-09-07 ENCOUNTER — OFFICE VISIT (OUTPATIENT)
Dept: ORTHOPEDICS | Facility: CLINIC | Age: 27
End: 2022-09-07
Payer: OTHER MISCELLANEOUS

## 2022-09-07 ENCOUNTER — MYC MEDICAL ADVICE (OUTPATIENT)
Dept: ORTHOPEDICS | Facility: CLINIC | Age: 27
End: 2022-09-07

## 2022-09-07 VITALS — BODY MASS INDEX: 22.6 KG/M2 | DIASTOLIC BLOOD PRESSURE: 68 MMHG | HEIGHT: 66 IN | SYSTOLIC BLOOD PRESSURE: 114 MMHG

## 2022-09-07 DIAGNOSIS — M22.42 CHONDROMALACIA OF LEFT PATELLA: ICD-10-CM

## 2022-09-07 DIAGNOSIS — M62.838 MUSCLE SPASM: ICD-10-CM

## 2022-09-07 DIAGNOSIS — M22.42 CHONDROMALACIA OF PATELLA, LEFT: ICD-10-CM

## 2022-09-07 DIAGNOSIS — M25.562 CHRONIC PAIN OF LEFT KNEE: Primary | ICD-10-CM

## 2022-09-07 DIAGNOSIS — G89.29 CHRONIC PAIN OF LEFT KNEE: Primary | ICD-10-CM

## 2022-09-07 PROCEDURE — 99213 OFFICE O/P EST LOW 20 MIN: CPT | Performed by: STUDENT IN AN ORGANIZED HEALTH CARE EDUCATION/TRAINING PROGRAM

## 2022-09-07 RX ORDER — HYDROCODONE BITARTRATE AND ACETAMINOPHEN 5; 325 MG/1; MG/1
TABLET ORAL
COMMUNITY
Start: 2022-09-01 | End: 2024-01-09

## 2022-09-07 RX ORDER — IBUPROFEN 600 MG/1
TABLET, FILM COATED ORAL
COMMUNITY
Start: 2022-09-01 | End: 2024-01-09

## 2022-09-07 RX ORDER — AMOXICILLIN 500 MG/1
CAPSULE ORAL
COMMUNITY
Start: 2022-07-28 | End: 2024-01-09

## 2022-09-07 NOTE — TELEPHONE ENCOUNTER
Per clinic staff, forms were completed by Dr. Yu on 9/2/22 and given to patient during office visit.    Closing encounter.    Serene Sharma MBA, ATC

## 2022-09-07 NOTE — LETTER
9/7/2022         RE: Kelly Muniz  [address]  [facility] [address]        Dear Colleague,    Thank you for referring your patient, Kelly Muniz, to the [facility] [place]. Please see a copy of my visit note below.    ASSESSMENT & PLAN  Patient Instructions     1. Chronic pain of left knee    2. Chondromalacia of left patella    3. Muscle spasm      Kelyl Muniz is a 26 year old presenting for follow up of acute left knee pain following twisting injury occurring at work on 4/4/22 (Work Comp injury). MRI showed a focal cartilage defect over the anterior weightbearing surface of the lateral femoral condyle, which does not necessary correlate with twisting injury at work but may have been aggravated by it. Noting improvement with the lateral  brace and making progress in physical therapy. Discussed her significant muscle spasm/guarding and recommendation to proceed with Neurology referral. She is considering this. Reassurance again provided today, and encouragement to continue gradually advancing activities.      - Continue formal physical therapy with focus on mobility, knee and core strengthening, mechanics and soft tissue modalities. New referral placed for further sessions.   - Continue roller skating and advancing activities gradually on your own.  - May continue the lateral  brace during weightbearing exercise. I would recommend gradually weaning out of the brace during low impact light activity (ie when at home).  - Tylenol 1000 mg up to 3 times per day as needed for comfort.  - Heat or ice for 10-15 minutes 3-4 times per day as needed based on pain.  - Workability form completed with advancement.   - FMLA form completed per patient request.  - Recommend proceeding with a trial of cortisone versus hyaluronic acid injection at our next visit in October.      Please schedule a follow up appointment to see me in 4 weeks (early October) as an  "injection visit, or sooner as needed for persistence or worsening of pain. You may call our direct clinic number ([phone]) at any time with questions or concerns.    Tiffanie Cheema MD, [facility]    -----    SUBJECTIVE:  Kelly Muniz is a 26 year old adult who is seen in follow-up for left knee pain. They were last seen by Dr. Yu on 9/2/22.     Since their last visit reports 70-80% improvement. They indicate that their current pain level is 5.5/10. They have tried  brace, physical therapy, home exercises, Voltaren 75mg, Tylenol, heat, ice, work restrictions.      The patient is seen by themselves.    Patient's past medical, surgical, social, and family histories were reviewed today and no changes are noted.    REVIEW OF SYSTEMS:  Constitutional: NEGATIVE for fever, chills, change in weight  Skin: NEGATIVE for worrisome rashes, moles or lesions  GI/: NEGATIVE for bowel or bladder changes  Neuro: NEGATIVE for weakness, dizziness or paresthesias    OBJECTIVE:  /68   Ht 1.676 m (5' 6\")   BMI 22.60 kg/m     General: healthy, alert and in no distress  HEENT: no scleral icterus or conjunctival erythema  Skin: no suspicious lesions or rash. No jaundice.  CV: regular rhythm by palpation, no pedal edema  Resp: normal respiratory effort without conversational dyspnea   Psych: normal mood and affect  Gait: antlagic gait with  knee brace; appropriate coordination and balance  Neuro: normal light touch sensory exam of the extremities.    MSK:  LEFT KNEE  Inspection:    Normal alignment  Palpation:    Mild TTP about the lateral patellar facet, lateral joint line. Remainder of bony and ligamentous landmarks are nontender.    No effusion is present    Patellofemoral crepitus is Absent  Range of Motion:     Full passive extension, flexion remains limited to 90 degrees by significant muscular spasm/guarding  Strength:    Quadriceps 5/5 and hamstrings " 5/5    Extensor mechanism intact     MR left knee without contrast 5/4/2022 3:15 PM     Impression:     1. No MR evidence of internal derangement. Intact menisci, cruciate  ligaments, and medial and lateral supporting structures.     2. Focal area of full-thickness chondral fissuring over the far  anterior weightbearing lateral femoral condyle.     MD Tiffanie OSORIO MD (Joe), [facility]              Again, thank you for allowing me to participate in the care of your patient.        Sincerely,        Tiffanie Cheema MD

## 2022-09-07 NOTE — PROGRESS NOTES
ASSESSMENT & PLAN  Patient Instructions     1. Chronic pain of left knee    2. Chondromalacia of left patella    3. Muscle spasm      Kelly Muniz is a 26 year old presenting for follow up of acute left knee pain following twisting injury occurring at work on 4/4/22 (Work Comp injury). MRI showed a focal cartilage defect over the anterior weightbearing surface of the lateral femoral condyle, which does not necessary correlate with twisting injury at work but may have been aggravated by it. Noting improvement with the lateral  brace and making progress in physical therapy. Discussed her significant muscle spasm/guarding and recommendation to proceed with Neurology referral. She is considering this. Reassurance again provided today, and encouragement to continue gradually advancing activities.      - Continue formal physical therapy with focus on mobility, knee and core strengthening, mechanics and soft tissue modalities. New referral placed for further sessions.   - Continue roller skating and advancing activities gradually on your own.  - May continue the lateral  brace during weightbearing exercise. I would recommend gradually weaning out of the brace during low impact light activity (ie when at home).  - Tylenol 1000 mg up to 3 times per day as needed for comfort.  - Heat or ice for 10-15 minutes 3-4 times per day as needed based on pain.  - Workability form completed with advancement.   - FMLA form completed per patient request.  - Recommend proceeding with a trial of cortisone versus hyaluronic acid injection at our next visit in October.      Please schedule a follow up appointment to see me in 4 weeks (early October) as an injection visit, or sooner as needed for persistence or worsening of pain. You may call our direct clinic number ([phone]) at any time with questions or concerns.    Tiffanie Cheema MD, [facility] Orthopedic  "Care    -----    SUBJECTIVE:  Kelly Muniz is a 26 year old adult who is seen in follow-up for left knee pain. They were last seen by Dr. Yu on 9/2/22.     Since their last visit reports 70-80% improvement. They indicate that their current pain level is 5.5/10. They have tried  brace, physical therapy, home exercises, Voltaren 75mg, Tylenol, heat, ice, work restrictions.      The patient is seen by themselves.    Patient's past medical, surgical, social, and family histories were reviewed today and no changes are noted.    REVIEW OF SYSTEMS:  Constitutional: NEGATIVE for fever, chills, change in weight  Skin: NEGATIVE for worrisome rashes, moles or lesions  GI/: NEGATIVE for bowel or bladder changes  Neuro: NEGATIVE for weakness, dizziness or paresthesias    OBJECTIVE:  /68   Ht 1.676 m (5' 6\")   BMI 22.60 kg/m     General: healthy, alert and in no distress  HEENT: no scleral icterus or conjunctival erythema  Skin: no suspicious lesions or rash. No jaundice.  CV: regular rhythm by palpation, no pedal edema  Resp: normal respiratory effort without conversational dyspnea   Psych: normal mood and affect  Gait: antlagic gait with  knee brace; appropriate coordination and balance  Neuro: normal light touch sensory exam of the extremities.    MSK:  LEFT KNEE  Inspection:    Normal alignment  Palpation:    Mild TTP about the lateral patellar facet, lateral joint line. Remainder of bony and ligamentous landmarks are nontender.    No effusion is present    Patellofemoral crepitus is Absent  Range of Motion:     Full passive extension, flexion remains limited to 90 degrees by significant muscular spasm/guarding  Strength:    Quadriceps 5/5 and hamstrings 5/5    Extensor mechanism intact     MR left knee without contrast 5/4/2022 3:15 PM     Impression:     1. No MR evidence of internal derangement. Intact menisci, cruciate  ligaments, and medial and lateral supporting structures.     2. Focal area " of full-thickness chondral fissuring over the far  anterior weightbearing lateral femoral condyle.     JERMAINE (MD Tiffanie Brown MD, [facility] and Orthopedic Care

## 2022-09-07 NOTE — LETTER
Kelyl Flavoi  YOB: 1995    Date of treatment: 09/07/2022.    Onset of condition: 04/04/22  Work related?  YES  Encounter Diagnoses   Name Primary?     Left knee injury, subsequent encounter Yes     Left knee cartilage defect         The patient is being treated with physical therapy,  bracing and activity modifications.   She is able to return to work with the following restrictions while wearing her knee brace until follow up in one month (October 7, 2022).     Lift/Carry (lbs)  Not at All Occassional (<2.5 hrs) Frequent (2.5-6 hrs) Continuous (6+)   0 - 10    X   11-25  X     26-35 X      36-50 X      51-75 X        Push/Pull (lbs)       0 - 20   X    20-50 X      51 + X        Walk  X     Sit    X   Stand X      Bend >45     X    Kneel/Squat X      Climb (Stairs/ladder) X        Able to Perform    Below Shoulder reach  6+  hrs.  Shoulder Level reach   6+  hrs.  Above Shoulder reach  6+  hrs.  Keyboard  6+  hrs.  Simple grasp/fine manipulation   6+  hrs.  Firm grasp  6+  hrs.    Make critical judgements:  YES  Do you anticipate that this patient will return to full duty within 12 weeks?  YES  Next scheduled provider visit: Approximately 4 weeks (10/7/22)               Physician/Provider Information-           Tiffanie Cheema MD, [facility] Orth[facility]

## 2022-09-08 NOTE — TELEPHONE ENCOUNTER
Reason for Call:  Form, our goal is to have forms completed with 7 days, however, some forms may require a visit or additional information.    Type of letter, form or note:  Employee Accommodation Request & Healthcare Provider RFI Form     Who is the form from?: Employer / [employer]    Where did the form come from: Patient or family brought in       Desired completion date of form: asap      How will form be returned?:  Please discuss with patient    Has the patient signed a consent form for release of information (may be included with form)? NO    Form was started and place in Provider Basket for provider review/ completion at [facility].     Serene Sharma MBA, ATC

## 2022-09-08 NOTE — TELEPHONE ENCOUNTER
Forms completed and signed. Placed in outbox and ready for fax/pickup.    Tiffanie Cheema MD, CAQSM  [facility]

## 2022-09-16 NOTE — PROGRESS NOTES
ASSESSMENT & PLAN  1. Chronic pain of left knee    2. Chondromalacia of left patella    3. Muscle spasm      Kelly Muniz is a 26 year old presenting for follow up of acute left knee pain following twisting injury occurring at work on 4/4/22 (Work Comp injury). MRI showed a small focal cartilage defect over the anterior weightbearing surface of the lateral femoral condyle. Noting improvement with the lateral  brace and making progress in physical therapy. Reassurance again provided today, and encouragement to continue gradually advancing activities.  - Letter provided clearing to return to work.  - Continue formal physical therapy with focus on mobility, knee and core strengthening, mechanics and soft tissue modalities. To reach out if new sessions are needed.  - Continue advancing activities gradually on your own.  -  brace as needed for flares. Continue gradually weaning out of the brace.  - Tylenol 1000 mg up to 3 times per day as needed for comfort.  - Heat or ice for 10-15 minutes 3-4 times per day as needed based on pain.    Please schedule a follow up as needed for persistence or worsening of pain. You may call our direct clinic number ([phone]) at any time with questions or concerns.    Tiffanie Cheema MD, [facility] and Orthopedic Care  -----    SUBJECTIVE:  Kelly Muniz is a 26 year old adult who is seen in follow-up for left anterior knee - work comp injury.They were last seen 9/7/2022.  She has continued with physical therapy.     Since their last visit reports 80 - 90% improvement since her initial injury. They indicate that their current pain level is 1- 2/10. They have tried rest/activity avoidance, ice, ibuprofen, physical therapy (18 visits), previous imaging (MRI 5/4/22).  Patient is wanting to revisit work restrictions today, notes she feels ready to return w/o restrictions.    Note after the visit, she returns stating that she actually needs the  "letter to request light duty restrictions. The letter was addended.     The patient is seen by themselves.    Patient's past medical, surgical, social, and family histories were reviewed today and no changes are noted.    REVIEW OF SYSTEMS:  Constitutional: NEGATIVE for fever, chills, change in weight  Skin: NEGATIVE for worrisome rashes, moles or lesions  GI/: NEGATIVE for bowel or bladder changes  Neuro: NEGATIVE for weakness, dizziness or paresthesias    OBJECTIVE:  /80   Ht 1.676 m (5' 6\")   Wt 59.9 kg (132 lb)   BMI 21.31 kg/m     General: healthy, alert and in no distress  HEENT: no scleral icterus or conjunctival erythema  Skin: no suspicious lesions or rash. No jaundice.  CV: regular rhythm by palpation, no pedal edema  Resp: normal respiratory effort without conversational dyspnea   Psych: normal mood and affect  Gait: normal steady gait with appropriate coordination and balance  Neuro: normal light touch sensory exam of the extremities.    MSK:  LEFT KNEE  Inspection:    Normal alignment  Palpation:    Minimal TTP about the lateral patellar facet, lateral joint line. Remainder of bony and ligamentous landmarks are nontender.    No effusion is present    Patellofemoral crepitus is Absent  Range of Motion:     Full passive extension, flexion remains limited by muscular spasm/guarding but improving  Strength:    Quadriceps 5/5 and hamstrings 5/5    Extensor mechanism intact     MR left knee without contrast 5/4/2022 3:15 PM     Impression:     1. No MR evidence of internal derangement. Intact menisci, cruciate  ligaments, and medial and lateral supporting structures.     2. Focal area of full-thickness chondral fissuring over the far  anterior weightbearing lateral femoral condyle.     MD Tiffanie OSORIO MD (Joe), [facility] and Orthopedic Care          "

## 2022-09-19 ENCOUNTER — THERAPY VISIT (OUTPATIENT)
Dept: PHYSICAL THERAPY | Facility: CLINIC | Age: 27
End: 2022-09-19
Payer: OTHER MISCELLANEOUS

## 2022-09-19 DIAGNOSIS — M25.562 ACUTE PAIN OF LEFT KNEE: Primary | ICD-10-CM

## 2022-09-19 PROCEDURE — 97110 THERAPEUTIC EXERCISES: CPT | Mod: GP | Performed by: PHYSICAL THERAPIST

## 2022-09-19 PROCEDURE — 97140 MANUAL THERAPY 1/> REGIONS: CPT | Mod: GP | Performed by: PHYSICAL THERAPIST
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
## 2022-09-21 ENCOUNTER — MYC MEDICAL ADVICE (OUTPATIENT)
Dept: ORTHOPEDICS | Facility: CLINIC | Age: 27
End: 2022-09-21

## 2022-09-21 DIAGNOSIS — M25.562 CHRONIC PAIN OF LEFT KNEE: Primary | ICD-10-CM

## 2022-09-21 DIAGNOSIS — M22.42 CHONDROMALACIA OF LEFT PATELLA: ICD-10-CM

## 2022-09-21 DIAGNOSIS — M62.838 MUSCLE SPASM: ICD-10-CM

## 2022-09-21 DIAGNOSIS — G89.29 CHRONIC PAIN OF LEFT KNEE: Primary | ICD-10-CM

## 2022-09-22 NOTE — TELEPHONE ENCOUNTER
Reason for Call:  Form, our goal is to have forms completed with 7 days, however, some forms may require a visit or additional information.    Type of letter, form or note:  medical leave request / ISSAC    Who is the form from?: [employer]    Where did the form come from: Patient or family brought in       Desired completion date of form: 10/4/22      How will form be returned?:  Fax [employer] form to [phone]    Has the patient signed a consent form for release of information (may be included with form)? patient provided verbal consent    Form was started and place in Provider Basket for provider review/ completion at [facility].       **Patient also states that [facility] is requesting another note requesting more PT sessions. Please fax new note or referral to [facility] @  [phone].**      Serene Sharma MBA, ATC

## 2022-09-24 NOTE — TELEPHONE ENCOUNTER
Work form completed, signed and placed in provider outbox -- please fax to [employer] per patient request (see MyChart message).     New PT referral placed for ongoing knee rehab.    Tiffanie Cheema MD, CAM  [facility]

## 2022-09-24 NOTE — TELEPHONE ENCOUNTER
Completed forms and physical therapy referral faxed to [facility] at [phone]    completed forms send to scanning    Eda Ca ATC

## 2022-10-03 ENCOUNTER — HEALTH MAINTENANCE LETTER (OUTPATIENT)
Age: 27
End: 2022-10-03

## 2022-10-07 ENCOUNTER — OFFICE VISIT (OUTPATIENT)
Dept: ORTHOPEDICS | Facility: CLINIC | Age: 27
End: 2022-10-07
Payer: OTHER MISCELLANEOUS

## 2022-10-07 VITALS
HEIGHT: 66 IN | WEIGHT: 132 LBS | DIASTOLIC BLOOD PRESSURE: 80 MMHG | SYSTOLIC BLOOD PRESSURE: 122 MMHG | BODY MASS INDEX: 21.21 KG/M2

## 2022-10-07 DIAGNOSIS — M25.562 CHRONIC PAIN OF LEFT KNEE: Primary | ICD-10-CM

## 2022-10-07 DIAGNOSIS — M62.838 MUSCLE SPASM: ICD-10-CM

## 2022-10-07 DIAGNOSIS — G89.29 CHRONIC PAIN OF LEFT KNEE: Primary | ICD-10-CM

## 2022-10-07 DIAGNOSIS — M22.42 CHONDROMALACIA OF LEFT PATELLA: ICD-10-CM

## 2022-10-07 PROCEDURE — 99213 OFFICE O/P EST LOW 20 MIN: CPT | Performed by: STUDENT IN AN ORGANIZED HEALTH CARE EDUCATION/TRAINING PROGRAM

## 2022-10-07 NOTE — LETTER
Kelly Muniz  YOB: 1995    Date of evaluation: 10/07/2022.  Onset of condition: 04/04/22  Work related?  YES  Encounter Diagnoses   Name Primary?     Left knee injury, subsequent encounter Yes     Left knee cartilage defect       Kelly was evaluated in clinic today for follow up of the above work-related injury. She is cleared to return to work with light duty. Please allow her to continue wearing her hinged knee brace. She will return to clinic for re-evaluation if symptoms worsen.              Please reach out with any further questions.      Sincerely,       Tiffanie Cheema MD, CAQSM  [facility] Orth[facility]

## 2022-10-07 NOTE — LETTER
Kelly Muniz  YOB: 1995    Date of evaluation: 10/07/2022.  Onset of condition: 04/04/22  Work related?  YES  Encounter Diagnoses   Name Primary?     Left knee injury, subsequent encounter Yes     Left knee cartilage defect       Kelly was evaluated in clinic today for follow up of the above work-related injury. She is cleared to return to work without restrictions as tolerated based on pain. Please allow her to continue wearing her hinged knee brace. She will return to clinic for re-evaluation if symptoms worsen.              Please reach out with any further questions.      Sincerely,       Tiffanie Cheema MD, CAQSM  [facility] Orthopedic [facility]
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
## 2022-10-10 ENCOUNTER — MYC MEDICAL ADVICE (OUTPATIENT)
Dept: ORTHOPEDICS | Facility: CLINIC | Age: 27
End: 2022-10-10

## 2022-11-07 PROBLEM — M25.562 ACUTE PAIN OF LEFT KNEE: Status: RESOLVED | Noted: 2022-04-28 | Resolved: 2022-11-07
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
## 2022-11-11 ENCOUNTER — TELEPHONE (OUTPATIENT)
Dept: PLASTIC SURGERY | Facility: CLINIC | Age: 27
End: 2022-11-11

## 2022-11-11 DIAGNOSIS — F64.0 GENDER DYSPHORIA IN ADULT: Primary | ICD-10-CM

## 2022-11-11 NOTE — CONFIDENTIAL NOTE
[facility] :  Care Coordination Note     SITUATION   Kelly Muniz (she/her) is a 26 year old adult who is receiving support for:  Care Team  .    BACKGROUND     Pt is scheduled for a vaginoplasty consult with Dr. Kingston on 04/11/23.     ASSESSMENT     Surgery              CGC Assessment  [facility] ([facility] Enrollment: Enrolled  Patient has a therapist: Yes  Letter of support #1: Requested  Letter of support #2: Requested  Surgery being considered: Yes  Vaginoplasty: Yes    Pt reports:  No smoking  HRT 1 year      PLAN          Nursing Interventions:       Follow-up plan:    1. Pt states her therapist faxed LOS but they aren't in her chart. Writer to follow up via [facility].        Elba Bolton

## 2022-11-14 ENCOUNTER — TELEPHONE (OUTPATIENT)
Dept: ORTHOPEDICS | Facility: CLINIC | Age: 27
End: 2022-11-14

## 2022-11-14 NOTE — TELEPHONE ENCOUNTER
Reason for Call:  Form, our goal is to have forms completed with 7 days, however, some forms may require a visit or additional information.    Type of letter, form or note:  work comp     Who is the form from?: [facility]    Where did the form come from: form was faxed in    Phone number of person requesting form: [phone]  Can we leave a detailed message on this number:  unsure    Desired completion date of form: asap      How will form be returned?:  fax to [phone]    Has the patient signed a consent form for release of information (may be included with form)? Not Applicable    Additional comments: claim # [id number]    Form requesting current medical records and work status for 10/7/22. New workability letter written on 10/10/22. Most up to date workability letter faxed per request. [facility].    Serene Sharma MBA, ATC

## 2022-11-19 ENCOUNTER — IMMUNIZATION (OUTPATIENT)
Dept: FAMILY MEDICINE | Facility: CLINIC | Age: 27
End: 2022-11-19
Payer: COMMERCIAL

## 2022-11-19 PROCEDURE — 91301 PR COVID VAC MODERNA 100 MCG/0.5 ML IM: CPT

## 2022-11-19 PROCEDURE — 0012A PR COVID VAC MODERNA 100 MCG/0.5 ML IM: CPT

## 2022-11-29 ENCOUNTER — DOCUMENTATION ONLY (OUTPATIENT)
Dept: PLASTIC SURGERY | Facility: CLINIC | Age: 27
End: 2022-11-29

## 2022-11-29 NOTE — PROGRESS NOTES
[facility] :  Care Coordination Note     SITUATION   Kelly Muniz is a 27 year old adult who is receiving support for:  Care Team  .    BACKGROUND     Reviewed Los. It meets criteria. Need 2nd LOS unless, by April, Dr. Kingston is using SOC 8. If so, 2nd Los is not needed and PA can be requested.     ASSESSMENT     Surgery              CGC Assessment  [employer] ([facility]) Enrollment: Enrolled  Patient has a therapist: Yes  Name of therapist: Holly Carvajal  Letter of support #1: Received  Letter #1 Date: 07/27/22  Letter of support #2: Requested  Surgery being considered: Yes  Vaginoplasty: Yes          PLAN          Nursing Interventions:       Follow-up plan:  Pt to have consult April 2023       GIANA MCCAIN

## 2022-12-08 ENCOUNTER — TELEPHONE (OUTPATIENT)
Dept: PLASTIC SURGERY | Facility: CLINIC | Age: 27
End: 2022-12-08

## 2022-12-08 NOTE — CONFIDENTIAL NOTE
Writer TRUE re: follow up on call from pt asking about tracheal shave. Writer sent [employer] message with information for Dr. No and will send message to ENT Care coordinator.

## 2022-12-09 ENCOUNTER — TELEPHONE (OUTPATIENT)
Dept: OTOLARYNGOLOGY | Facility: CLINIC | Age: 27
End: 2022-12-09

## 2022-12-09 NOTE — TELEPHONE ENCOUNTER
LVM to schedule new throat with Dr. No on 1/19 at 12 ok per Carolyn      appt notes: consult for tracheal shave - Self referred. Ok per Carolyn      Gave direct number

## 2022-12-13 NOTE — TELEPHONE ENCOUNTER
FUTURE VISIT INFORMATION      FUTURE VISIT INFORMATION:    Date: 1/19/23    Time: 12:00 PM    Location: [facility] [place]  REFERRAL INFORMATION:    Referring provider:      Referring providers clinic:      Reason for visit/diagnosis  consult for tracheal shave - Self referred. Ezekiel Leon    RECORDS REQUESTED FROM:       Clinic name Comments Records Status Imaging Status                                         12/13/22 9:58am - Per patient, no outside records or imaging. Diann

## 2022-12-23 ENCOUNTER — TELEPHONE (OUTPATIENT)
Dept: ORTHOPEDICS | Facility: CLINIC | Age: 27
End: 2022-12-23

## 2022-12-23 NOTE — TELEPHONE ENCOUNTER
Fax received from [facility] requesting medical records and billing info. Request faxed to medical records for completion of request.     Verena Tipton MSA, ATC  Certified Athletic Trainer

## 2023-01-12 NOTE — TELEPHONE ENCOUNTER
FUTURE VISIT INFORMATION      FUTURE VISIT INFORMATION:    Date: 4/11/23    Time: 3:00pm    Location: [facility] [place]  REFERRAL INFORMATION:    Reason for visit/diagnosis  vaginoplasty    RECORDS REQUESTED FROM:       No recs to collect

## 2023-01-19 ENCOUNTER — OFFICE VISIT (OUTPATIENT)
Dept: OTOLARYNGOLOGY | Facility: CLINIC | Age: 28
End: 2023-01-19
Payer: COMMERCIAL

## 2023-01-19 ENCOUNTER — PRE VISIT (OUTPATIENT)
Dept: OTOLARYNGOLOGY | Facility: CLINIC | Age: 28
End: 2023-01-19

## 2023-01-19 ENCOUNTER — TELEPHONE (OUTPATIENT)
Dept: OTOLARYNGOLOGY | Facility: CLINIC | Age: 28
End: 2023-01-19

## 2023-01-19 VITALS
BODY MASS INDEX: 21.69 KG/M2 | DIASTOLIC BLOOD PRESSURE: 70 MMHG | HEIGHT: 66 IN | HEART RATE: 105 BPM | OXYGEN SATURATION: 97 % | SYSTOLIC BLOOD PRESSURE: 108 MMHG | WEIGHT: 135 LBS

## 2023-01-19 DIAGNOSIS — R49.9 VOICE COMPLAINT: ICD-10-CM

## 2023-01-19 DIAGNOSIS — R49.9 VOICE AND RESONANCE DISORDER: ICD-10-CM

## 2023-01-19 DIAGNOSIS — F64.0 GENDER DYSPHORIA IN ADULT: ICD-10-CM

## 2023-01-19 DIAGNOSIS — R49.9 VOICE COMPLAINT: Primary | ICD-10-CM

## 2023-01-19 DIAGNOSIS — R49.0 DYSPHONIA: Primary | ICD-10-CM

## 2023-01-19 PROBLEM — B18.2 HEPATITIS C, CHRONIC (H): Status: ACTIVE | Noted: 2023-01-19

## 2023-01-19 PROCEDURE — 31575 DIAGNOSTIC LARYNGOSCOPY: CPT | Performed by: OTOLARYNGOLOGY

## 2023-01-19 PROCEDURE — 99203 OFFICE O/P NEW LOW 30 MIN: CPT | Mod: 25 | Performed by: OTOLARYNGOLOGY

## 2023-01-19 PROCEDURE — 92524 BEHAVRAL QUALIT ANALYS VOICE: CPT | Mod: GN | Performed by: SPEECH-LANGUAGE PATHOLOGIST

## 2023-01-19 RX ORDER — SPIRONOLACTONE 100 MG/1
100 TABLET, FILM COATED ORAL DAILY
COMMUNITY
Start: 2022-04-19 | End: 2024-01-09

## 2023-01-19 RX ORDER — ESTRADIOL VALERATE 20 MG/ML
INJECTION INTRAMUSCULAR
COMMUNITY
Start: 2022-12-17

## 2023-01-19 ASSESSMENT — PAIN SCALES - GENERAL: PAINLEVEL: NO PAIN (0)

## 2023-01-19 NOTE — LETTER
Date:January 20, 2023      Patient was self referred, no letter generated. Do not send.        [facility] Health Information

## 2023-01-19 NOTE — PROGRESS NOTES
[facility]   at the [facility]   Otolaryngology Clinic     Patient: Kelly Muniz    MRN: [id number]    : 1995    Age/Gender: 27 year old adult  Date of Service: 2023  Rendering Provider:   Portia No MD     Referring Provider   PCP: No Ref-Primary, Physician  Referring Physician: Referred MD John  No address on file  Reason for Consultation   Tracheal shave  History   HISTORY OF PRESENT ILLNESS: Ms. Muniz is a 27 year old adult who presents to us today with tracheal shave.     she presents today for evaluation. she reports:    - comfrotable with voice  - uncomfortable with presence of laryngeal prominence  - large part of dysphoria  - interested for tracheal shave  - knows there is potential for scarring  - doesn't experience keloids  - no sports or activities that would create blows towards the neck  - has consultation for bottom surgery in April  - no surgical history  - bothered by prominence and that it moves up and down    PAST MEDICAL HISTORY: No past medical history on file.    PAST SURGICAL HISTORY: No past surgical history on file.    CURRENT MEDICATIONS:   Current Outpatient Medications:      amoxicillin (AMOXIL) 500 MG capsule, TAKE 1 CAPSULE BY MOUTH THREE TIMES DAILY UNTIL ALL TAKEN, Disp: , Rfl:      diclofenac (VOLTAREN) 75 MG EC tablet, Take 1 tablet (75 mg) by mouth 2 times daily, Disp: 20 tablet, Rfl: 0     estradiol (ESTRACE) 2 MG tablet, , Disp: , Rfl:      HYDROcodone-acetaminophen (NORCO) 5-325 MG tablet, TAKE 1 TABLET BY MOUTH EVERY 6 HOURS AS NEEDED FOR PAIN, Disp: , Rfl:      ibuprofen (ADVIL/MOTRIN) 600 MG tablet, TAKE 1 TABLET BY MOUTH EVERY 6 HOURS AS NEEDED FOR PAIN, Disp: , Rfl:      spironolactone (ALDACTONE) 50 MG tablet, Take 150 mg by mouth daily, Disp: , Rfl:     ALLERGIES: Patient has no known allergies.    SOCIAL HISTORY:    Social History     Socioeconomic History     Marital status: Single     Spouse name: Not on file     Number of  children: Not on file     Years of education: Not on file     Highest education level: Not on file   Occupational History     Not on file   Tobacco Use     Smoking status: Never     Smokeless tobacco: Never   Vaping Use     Vaping Use: Never used   Substance and Sexual Activity     Alcohol use: Never     Drug use: Never     Sexual activity: Not Currently   Other Topics Concern     Not on file   Social History Narrative    ** Merged History Encounter **          Social Determinants of Health     Financial Resource Strain: Not on file   Food Insecurity: Not on file   Transportation Needs: Not on file   Physical Activity: Not on file   Stress: Not on file   Social Connections: Not on file   Intimate Partner Violence: Not on file   Housing Stability: Not on file         FAMILY HISTORY:   Family History   Problem Relation Age of Onset     Breast Cancer Mother      Non-contributory for problems with anesthesia    REVIEW OF SYSTEMS:   The patient was asked a 14 point review of systems regarding constitutional symptoms, eye symptoms, ears, nose, mouth, throat symptoms, cardiovascular symptoms, respiratory symptoms, gastrointestinal symptoms, genitourinary symptoms, musculoskeletal symptoms, integumentary symptoms, neurological symptoms, psychiatric symptoms, endocrine symptoms, hematologic/lymphatic symptoms, and allergic/ immunologic symptoms.   The pertinent factors have been included in the HPI and below.  Patient Supplied Answers to Review of Systems  No flowsheet data found.    Physical Examination   The patient underwent a physical examination as described below. The pertinent positive and negative findings are summarized after the description of the examination.  Constitutional: The patient's developmental and nutritional status was assessed. The patient's voice quality was assessed.  Head and Face: The head and face were inspected for deformities. The sinuses were palpated. The salivary glands were palpated. Facial  muscle strength was assessed bilaterally.  Eyes: Extraocular movements and primary gaze alignment were assessed.  Ears, Nose, Mouth and Throat: The ears and nose were examined for deformities. The nasal septum, mucosa, and turbinates were inspected by anterior rhinoscopy. The lips, teeth, and gums were examined for abnormalities. The oral mucosa, tongue, palate, tonsils, lateral and posterior pharynx were inspected for the presence of asymmetry or mucosal lesions.    Neck: The tracheal position was noted, and the neck mass palpated to determine if there were any asymmetries, abnormal neck masses, thyromegally, or thyroid nodules.  Respiratory: The nature of the breathing and chest expansion/symmetry was observed.  Cardiovascular: The patient was examined to determine the presence of any edema or jugular venous distension.  Abdomen: The contour of the abdomen was noted.  Lymphatic: The patient was examined for infraclavicular lymphadenopathy.  Musculoskeletal: The patient was inspected for the presence of skeletal deformities.  Extremities: The extremities were examined for any clubbing or cyanosis.  Skin: The skin was examined for inflammatory or neoplastic conditions.  Neurologic: The patient's orientation, mood, and affect were noted. The cranial nerve  functions were examined.  Other pertinent positive and negative findings on physical examination:      OC/OP: no lesions, uvula midline, soft palate elevates symmetrically   Neck: no lesions, no TH tenderness to palpation     All other physical examination findings were within normal limits and noncontributory.  Procedures   Video Laryngoscopy with Stroboscopy (CPT 90838)    Preoperative Diagnosis:  Dysphonia and throat symptoms  Postoperative Diagnosis: Dysphonia and throat symptoms  Indication:  Patient has new or persistent dysphonia and throat symptoms.  This requires evaluation by stroboscopy to fully delineate the laryngeal functioning; especially mucosal  wave assessment, ultrasharp visualization of lesions on the vocal folds, and overall functioning of the larynx.  Details of Procedure: A 70 degree rigid telescopic laryngoscope or a distal chip flexible scope was used. The lighting was obtained via a light cable connected to a stroboscopic unit. The telescope was inserted either transorally or transnasally until the vocal folds could be visualized. The patient was instructed to sustain the vowel  ee  at a comfortable pitch and loudness as the voice was monitored by a microphone connected to a fundamental frequency tracker. This circuit tracked vocal periodicity, allowing the light to flash in synchrony with the glottal cycles. A setting on the stroboscope was set to change the phase of light strobing with relation to the vocal fundamental frequency, producing an image of 1 to 2 glottal cycles every second. The video images were recorded for analysis. Use of the variable speed, slow and stop scan allowed careful study of pertinent segments of laryngeal function to increase accuracy of clinical assessments of function and dysfunction.  In particular, features of glottal closure, mucosal wave on the vocal fold cover and laryngeal symmetry were analyzed. Lastly, the patient was asked to phonate speech samples and auditory/perceptual evaluation of voice and resonance were performed.  The vocal quality was carefully evaluated for hoarseness, breathiness, loudness, phrase length and intelligibility to determine the source of dysphonia.    Findings:      B. LARYNGOVIDEOSTROBOSCOPY   Anatomic/Physiological Deviations:  RNC, vocal folds normal, patent subglottis, no lesions  Mucosal wave: Right:  No restriction     Left: No restriction  Bilateral Vocal Fold Vibration: Symmetric  Vocal Process: Right: No restriction    Left:  No restriction  Vocal Fold closure: Complete glottal closure    Complication(s): None  Disposition: Patient tolerated the procedure well          Review  of Relevant Clinical Data   I personally reviewed:  Notes:    Radiology:    Pathology:    Procedures:    Labs:  No results found for: TSH  No results found for: NA, CO2, BUN, CREAT, GLUCOSE, PHOS  No results found for: WBC, HGB, HCT, MCV, PLT  No results found for: PT, PTT, INR  No results found for: ALESHA  No components found for: RHEUMATOIDFACTOR,  RF  No results found for: CRP  No components found for: CKTOT, URICACID  No components found for: C3, C4, DSDNAAB, NDNAABIFA  No results found for: MPOAB    Patient reported Quality of Life (QOL) Measures   Patient Supplied Answers To VHI Questionnaire  No flowsheet data found.      Patient Supplied Answers To EAT Questionnaire  No flowsheet data found.      Patient Supplied Answers To CSI Questionnaire  No flowsheet data found.      Patient Supplied Answers to Dyspnea Index Questionnaire:  No flowsheet data found.    Impression & Plan     IMPRESSION: Ms. Muniz is a 27 year old adult who is being seen for the followin. Laryngeal prominence  - bothered by laryngeal prominence as a source of dysphoria  - not bothered by voice too much  - she is interested in tracheal shave  - has a consultation for bottom surgery in April  - scope shows vocal folds are normal, patent subglottis, no lesions  - discussed tracheal shave procedure and risks of surgery including scarring, and trading laryngeal prominence with scar  - discussed timing of bottom surgery and need for 3 months of healing prior to other surgeries, vs doing tracheal shave at the end  - before pictures taken today  Plan  - consider surgery and send mychart to schedule    RETURN VISIT: for surgery    Thank you for the kind referral and for allowing me to share in the care of Ms. Muniz. If you have any questions, please do not hesitate to contact me.    Scribe Disclosure:  I, Raul Wilkes, am serving as a scribe to document services personally performed by Portia No MD based on data collection and the  provider's statements to me.     Portia No MD    Laryngology    [facility]  Department of  Otolaryngology - Head and Neck Surgery  [facility] [place] [address]  Appointment line: [phone]  Fax: [phone]  [email or web address]

## 2023-01-19 NOTE — PROGRESS NOTES
"[facility]  [facility] - Dept. of Otolaryngology   Evaluation report - IN PERSON APPOINTMENT    Clinician: Lary Hamilton M.M. (voice), M.A., CCC-SLP  Seen in conjunction with: Dr. Portia No  Referring physician:  Self  Patient: Kelly Muniz  Date of Visit: 1/19/2023    HISTORY    Chief complaint: Kelly Muniz is a 27 year old presenting today for evaluation of dysphonia secondary to gender dysphoria.      CURRENT SYMPTOMS INCLUDE    Experiences significant dysphoria from the presence of her \"Maximo's apple\".     She has completed her own research.  She has learned that there would be minimal scarring, and that it can take 6-12 mo to heal.    She would like to have the thyroid notch shaved down    She does not participate in mixed martial arts, or other activities that would potentially involve a blow to the neck area    Has a significant amount of psoraisis on her right arm - allergic to marie butter, which she used to frequently use     She now uses natural remedies.    VOICE:     Does not like to be misgendered.    Her  does not mind her voice.    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     She would like to consider gender affirming voice therapy    THROAT ISSUES:     Denies issues    SWALLOWING:     Denies issues    RESPIRATION:     Denies issues    ADDITIONAL    Reflux: denies issues    Used to live in [place]  - parents dies in 2018, divorce.     OTHER PERTINENT HISTORY    Complex medical history: please also refer to Dr. No's dictation.     No past medical history on file.  No past surgical history on file.    OBJECTIVE  PATIENT REPORTED MEASURES  Patient Supplied Answers To VHI Questionnaire  Voice Handicap Index (VHI-10) 1/19/2023   My voice makes it difficult for people to hear me 2   People have difficulty understanding me in a noisy room 2   My voice difficulties restrict my personal and social life.  2   I " "feel left out of conversations because of my voice 4   My voice problem causes me to lose income 4   I feel as though I have to strain to produce voice 2   The clarity of my voice is unpredictable 4   My voice problem upsets me 4   My voice makes me feel handicapped 4   People ask, \"What's wrong with your voice?\" 0   VHI-10 28     Speech follow up as discussed with patient:  Dysponia SLP Goals 1/19/2023   How would you rate your speaking voice quality, if 0 is worst voice quality, and 10 is best voice? 0   How well does your voice align with your identity, where 0 is \"my voice doesn't align at all\", and 10 is \"my voice aligns completely\"? 0   How much does your voice problem bother you? Very Much       PERCEPTUAL EVALUATION (CPT 83990)  POSTURE / TENSION/ PALPATION OF THE LARYNGEAL AREA:     upper body    BREATHING:     appears within normal limits and adequate     LARYNGEAL PALPATION:     supple musculature    VOICE:    Flavio states that today is a typical voice today, with clinician observing voice quality to be characterized as:    Roughness: Mild Intermittent    Breathiness: WNL    Strain: WNL    Pitch:    F0/ Habitual Pitch: not formally tested, but judged to be low  o Resonance:     Conversational speech:  laryngeal pharyngeal resonance    Loudness    Conversational speech:  WNL    Projected speech:  WNL     Singing vs. Speech:  n/a    GLOBAL ASSESSMENT OF DYSPHONIA: 25/100    COUGH/THROAT CLEARING:    Not observed    LARYNGEAL FUNCTION STUDIES (CPT 39709)  n/a    LARYNGEAL EXAMINATION  Procedure: Flexible endoscopy with chip-tip technology with stroboscopy, right nostril; topical anesthesia with 3% Lidocaine and 0.25% phenylephrine was applied.   Performed by: Dr. No  The laryngeal and pharyngeal structures were evaluated for gross appearance, mobility, function, and focal lesions / abnormalities of the associated mucosa.  Stroboscopy was warranted to evaluate closure, symmetry, and vibratory " "characteristics of the vocal folds.  All findings were within normal limits with the exception of the following salient features:   This exam shows the following:    Posterior commissure: Normal  Secretions: mild collections in the larynx and on the vocal folds    Movement:  Right Vocal Fold: Normal  Left Vocal Fold: Normal  Mild to moderate AP constriction during connected speech tasks    Glottic Closure: Normal  Upper Airway: Patent    Vocal Fold Findings:  Right Vocal Fold: WNL  Left Vocal Fold: WNL    STROBOSCOPY  Mucosal Wave: WNL  Phase: symmetric    Right Vocal Fold  Periodicity: regular  Amplitude: Normal    Left Vocal Fold  Periodicity: regular  Amplitude: Normal    Other Findings: none    THERAPY PROBES: Improvement was elicited with use of forward resonant stimuli, coordination of respiration and phonation and use of yawn sigh    The laryngeal exam was reviewed with MsKenia Flavio, and I provided pertinent explanations, as well as written and oral information.     ASSESSMENT / PLAN  IMPRESSIONS: Kelly Muniz is a 27 year old, presenting today with Dysphonia (R49.0) and Voice and Resonance Disorder (R49.9) in the context of Gender Dysphoria (F64.0), as evidenced by evaluation the results of the evaluation and the laryngeal exam.    Remarkable findings included:    Perceptual evaluation demonstrated:     Roughness: Mild Intermittent    Breathiness: WNL    Strain: WNL    Pitch:    F0/ Habitual Pitch: not formally tested, but judged to be low  o Resonance:     Conversational speech:  laryngeal pharyngeal resonance      Laryngeal exam demonstrated: mild to moderate supraglottic AP constriction during connected speech tasks.     Primary complaint of patient today included: interest in pursuing a \"tracheal shave\", as well as interest in gender affirming voice care.   Therefore, we recommended a course of speech therapy to address these concerns.    Speech Therapy is deemed medically necessary to achieve optimal " expressive communication, without therapy, Kelly will not be able to safely and effectively communicate wants and needs in certain settings, and also would experience significant dysphoria.    STIMULABILITY: results of therapy probes during perceptual and laryngeal evaluation demonstrate improvement with use of forward resonant stimuli, coordination of respiration and phonation and use of yawn sigh    RECOMMENDATIONS:     A course of speech therapy is recommended to optimize vocal technique, improve voice quality and promote reduced discomfort, effort and fatigue.    She demonstrates a Good prognosis for improvement given adherence to therapeutic recommendations.     Positive indicators: positive response to therapy probes diagnosis is known to respond to treatment high level of comittment    Negative indicators: none    Research: Will need to be asked at next appt.    DURATION / FREQUENCY: 4 one-hour sessions.  A total of 6-8 sessions may be necessary.    GOALS:  Patient goal:   1. To improve and maintain a healthy voice quality  2. To understand the problem and fix it as much as possible  3. To have a normal and acceptable voice quality    Long-term goal(s): In 6 months, Ms. Muniz will:  1. Report resolution of symptoms, and use of optimal voice quality and comfort to meet personal, social, and professional needs, 90% of the time during a typical week of vocal activities    This treatment plan was developed with the patient who agreed with the recommendations.    TOTAL SERVICE TIME: 60 minutes  EVALUATION OF VOICE AND RESONANCE (98162)  NO CHARGE FACILITY FEE (73625)    Lary Hamilton M.M. (voice), M.A., CCC/SLP  Speech-Language Pathologist  [facility] Trained Vocologist  [facility]  [phone]  [email or web address]  Pronouns: she/her      *this report was created in part through the use of computerized dictation software, and though reviewed following completion, some typographic errors may  persist.  If there is confusion regarding any of this notes contents, please contact me for clarification

## 2023-01-19 NOTE — PATIENT INSTRUCTIONS
1.  You were seen in the ENT Clinic today by . If you have any questions or concerns after your appointment, please call [phone]. Press option #1 for scheduling related needs. Press option #3 for Nurse advice.    2.   has recommended  the following:   - consider surgery, reach out via my chart.    3.  Plan is to return to clinic for post operative follow up      Carolyn Truong LPN  [phone]  [facility] - Otolaryngology

## 2023-01-19 NOTE — PATIENT INSTRUCTIONS
Lary Hamilton M.M. (voice), M.A., CCC/SLP  Speech-Language Pathologist  [facility] Trained Vocologist  [facility]  [email or web address]   she/her  [email or web address]    Muscle Tension Dysphonia    One of the most common voice disorders we treat at the [facility] is muscle tension dysphonia (MTD).  The root word phon means  sound .  Phonation refers to the sound made by the voice.  The term dysphonia means there is something wrong with the voice.  However, muscle tension dysphonia can also refer to a voice that sounds normal but causes pain, discomfort, or fatigue to the voice user.  MTD is known as a functional disorder; that is, there is nothing structurally wrong with the voice.  Rather, the muscles do not function properly, which causes poor sound, discomfort, or increased effort.    Symptoms of MTD    Different individuals may have very different symptoms of MTD.  Possible voice characteristics include     rough, hoarse, gravelly, raspy     weak, breathy, airy, leaky, backward, hollow     strained, pressed, squeezed, tight, tense, choked, effortful     jerky, shaky, halting,     suddenly cutting out, squeezing shut, breaking off, changing pitch, or fading away     giving out gradually, or becoming weaker or more tense as voice use continues     excessively high or low pitch     inability to produce a loud or clear voice     inability to sing notes that used to be easy    Possible sensations of MTD include     pain or discomfort anywhere in the throat  area associated with voice use     a tight choking sensation with voice use     fatigue or effort that increases with voice use     some area of the neck becoming tender to the touch     feeling a need to clear the throat frequently     a feeling of a lump in the throat    Causes of Muscle Tension Dysphonia  There are many specific, individual reasons why use of the vocal mechanism becomes abnormal.  Some common causes are  prolonged illness, prolonged voice overuse, prolonged voice underuse (such as after a surgery), and trauma, such as an injury, chemical exposure, or an emotionally traumatic event.  These lead to an abnormal vocal response, causing the individual to compensate by using extra effort while talking.    The onset of MTD can be very subtle.  The individual is usually unaware of the extra effort, but this extra effort typically recruits muscles that are not part of the larynx (also known as the voice box).  The result may or may not be a stronger voice, but a vicious cycle usually starts in which more and more effort is required.  This cycle may continue for months or even years before the individual becomes aware that the voice is abnormal.    Treatment of MTD  Functional voice therapy is usually the only treatment available for MTD.  The amount of time required to complete functional therapy varies from only a few sessions to many months, but generally some relief is gained in the first 4 to 6 sessions.  In cases of emotional stress, counseling or stress management may be helpful or even necessary.    Occasionally, medical or surgical treatments may be tried.  Botox injections may be useful in severe cases.  Surgery has been tried in very few cases but is not done at the [facility].  Muscle relaxants are NOT useful for muscle tension dysphonia.  The action of these drugs is not localized to the vocal mechanism, so in order to provide enough relaxation for the vocal mechanism, the individual is often unable to function for day-to-day living.    Types of Muscle Tension Dysphonia  Muscle tension in the vocal mechanism can be exhibited in many ways.  Each individual is different, but a few common patterns are:     Anterior-posterior constriction     Hyperabduction     Hyperadduction     Pharyngeal constriction     Ventricular phonation     Vocal fold bowing (explained in another brochure)     back  "          r  i  g  h  t       front   A cross-section of the larynx (voice box) as seen from above.  This diagram may be helpful for visualizing the descriptions below.    Anterior-Posterior Constriction  In anterior-posterior constriction, the arytenoid cartilages bend forward during voice use, and/or the epiglottis bends backwards, causing the larynx to squeeze from front to back.  As effort increases, squeezing continues until the vocal folds (also called vocal cords) cannot be seen and may be unable to vibrate.  In extreme cases, especially in children, the arytenoids may actually vibrate against the epiglottis. The sound of the voice for someone with anterior-posterior constriction could range from normal to extremely squeezed and tight.  The voice may sound rough if the squeezing causes irregular vibration of the vocal folds.  Some experience a \"froggy\" sound if the arytenoids and epiglottis vibrate.  Someone with anterior-posterior constriction may complain of discomfort, increasing pain during voice use that may remain during rest, or fatigue and decline of voice quality as the voice is used more and more.    Hyperabduction  Abduction is the term for the vocal folds coming apart during breathing.  When a person has hyperabduction, the vocal folds do not sufficiently come together to produce voice.  They may appear to be pulled apart as the person phonates.  An emotional component may be present with this type of MTD.    A person with hyperabduction may have a weak, breathy, airy, very soft, or hollow voice, sometimes with breaks in phonation.  He or she may experience effort and fatigue.  Often times, functional therapy is combined with psychotherapy to treat hyperabduction.  In very severe cases, Botox injections are also a useful treatment.    Hyperadduction  In hyperadduction, the vocal folds adduct (come together) excessively tightly, restricting airflow during phonation.  The larynx may look normal in " an exam, but the sound and sensation are not.    The sound of a voice with hyperadduction ranges from normal to extremely tight, pressed, squeezed, strangled, forced or effortful.  The muscle tension may be irregular, causing a stopping/starting or shaking effect.  A person with hyperadduction may experience pain, discomfort, and effort and fatigue, usually increasing with continued voice use.  In very severe cases, Botox injections are helpful    Pharyngeal Constriction  During pharyngeal constriction, muscles of the pharynx (throat) contract excessively during voice use, making the throat very constricted.  The sound of the voice ranges from normal to very tight or squeezed.  It may be tremulous or throaty sounding.  A person with pharyngeal constriction may experience discomfort, pain, fatigue, or declining voice quality with use.  Pain usually increases with voice use but may remain during rest.  Sometimes emotional stress can provoke pharyngeal constriction.    Ventricular Phonation  This type of MTD is also called plica ventricularis, ventricular dysphonia, or false cord phonation.  The ventricular folds come together and vibrate instead of, or along with, the vocal folds.  The ventricular folds, also known as the false vocal cords, are mounds of fleshy tissue just above the vocal folds.  Though the ventricular folds are not muscular, they can be brought together and vibrated.  However, they were not meant to vibrate, so they cannot produce high pitches or loud sounds.  Pressure from the ventricular folds is usually strong enough to keep the true vocal folds from vibrating.  Most often, ventricular phonation is caused by continued voice use while true vocal folds are impaired, though sometimes extreme strain in response to a trauma is the cause.    Ventricular phonation sounds very rough and strained, sometimes inhuman, limited in pitch and volume.  One who uses ventricular phonation may experience fatigue  (especially with attempts at loud voice use), pain or dryness with phonation.  However, sometimes no discomfort will be sensed at all.  In extreme cases, medical or surgical treatments may be tried to treat ventricular phonation, but only after functional therapy has failed.  In some cases, ventricular phonation is the best alternative for persons whose true vocal folds will always be too impaired to vibrate.      You have been referred for functional voice therapy    Why?    Therapy is very useful in treating all voice disorders, regardless of the type of disorder.  If you have:    Muscle tension dysphonia: Your voice disorder is caused by abnormal use of the muscles of the vocal mechanism, and functional voice therapy will teach your muscles how to work properly.  A growth on your vocal folds (such as nodules, polyps, or cysts): Your lesion may be caused by inappropriate use of the muscles, and therefore can only be cured by learning techniques for better muscle use.  Or, your lesion may cause the muscles to be used incorrectly, and relearning better technique is an important part of overall treatment.  If your lesion needs to be surgically removed, learning to use good muscle technique helps ensure an optimal surgical result.  A vocal fold paralysis: Learning to use your muscles to compensate can be very helpful, and may even eliminate the need for surgery.  About muscle tension dysphonia    One of the most common voice disorders we treat is muscle tension dysphonia (MTD).  Dysphonia simply means that the sound of the voice is insufficient for its intended purpose. MTD, however, may also refer to a voice that sounds normal, but causes pain, discomfort, or fatigue to the voice user.  MTD is considered a functional disorder; that is, the muscles do not function properly, causing poor sound, discomfort, or a sensation of increased effort.    There are many possible reasons why use of the vocal mechanism becomes  abnormal.  Some general causes are very common:  prolonged illness  prolonged overuse  prolonged underuse (such as after a surgery)  trauma, such as an injury, chemical exposure, or emotionally traumatic event    These can lead to an abnormal muscle response, causing a person to use more effort while talking.  Moreover, the pushing and squeezing can be very subtle, making the individual unaware of the extra effort.  The result may or may not be a stronger voice, but it usually starts a vicious cycle where more and more effort is required.  This cycle can continue for months or even years before the individual becomes aware that his or her voice is abnormal.  Usually, the only treatment available for muscle tension dysphonia is functional therapy.      Basics of functional voice therapy    There are some general things you should know about functional voice therapy.  Functional voice therapy . . .  is also known as voice therapy or behavioral voice therapy.  should only be done after a thorough evaluation by an ENT (ear, nose, and throat) physician.  should be done with a certified speech-language pathologist who specializes in voice disorders.  includes education about the use and care of the voice and how the voice works.  uses progressive exercises taught over a series of sessions.  varies in length from several sessions to many sessions over a few months, but some relief in symptoms is almost always gained within the first 4-6 sessions.  may, in the case of emotional stress, need to be accompanied by counseling or stress management.  Functional voice therapy at the [facility]    At the [facility], your functional therapy is done under the direction of Dr. DELFINA Nobles or Lary Hamilton.  After a voice evaluation, a treatment plan is discussed with you, and therapy sessions are scheduled.  The plan for therapy varies from person to person, but in general, sessions occur weekly for one hour at first.   Sessions gradually become more spaced apart as you learn more advanced techniques.  After a few sessions, you may need more time to practice and incorporate your techniques into everyday speech.    The first few sessions generally include a thorough discussion of your vocal lifestyle - voice needs, activities, and habits.  We will teach you how to keep the voice healthy regardless of the level of voice activity.  You will also learn pertinent information about how the voice works, helping you understand the therapeutic process better.  Then, exercises are taught to keep the upper body relaxed while using the voice, and to ensure optimal breathing technique.    At this point, specific voice exercises are taught.  Certain sounds affirm that the muscles are used correctly.  You will learn exercises to achieve these sounds first.  Once these sounds are mastered, you will advance to words, sentences, and finally conversational speech.  During your therapy sessions, exercises will be tailored to your vocal strengths and weaknesses.  During therapy, you will probably find it helpful to record your therapy session on compact disc.  Recording the exercises makes it easier to practice at home.  We encourage you to bring a CD with you to therapy.    Health insurance coverage for functional voice therapy    A normal sounding voice, free from discomfort or fatigue, is considered a normal function.  If it is disordered, restoring it is considered medically necessary.  Most insurance companies recognize this, and therapy is covered under most policies.    Functional voice therapy is considered a form of speech therapy.  If your insurance policy covers rehabilitation services such as physical therapy and speech therapy, therapy for a voice disorder should be covered.  If you have any questions about coverage, or problems with coverage for your voice treatment, please talk to Dr. Nobles or Ms. Hamilton.  They can offer you  strategies for getting them resolved.    For more information on this topic, visit our web site at [email or web address]      Identity Affirming Voice Training  Your voice plays a distinct role in embodying identity, and consequently, communication can be   a powerful tool for exploring and defining who you are and how people view you. No matter   where you identify on the gender spectrum - transfeminine, transmasculine, gender   nonconforming, genderqueer - the key to changing your communication style is to find the   right combination of speech/voice features that become what I call the three A s: authentic   (what feels true to you), acceptable (what is effective based on the norms of the outside world),   and automatic (what becomes easy to perform with less and less mental effort). Pitch is   typically the first thing many wish to consider, but additional speech features can also be   utilized, such as resonance and intonation. A more feminine voice sounds smaller, softer,   lighter, and more expressive, while a more masculine voice sounds bigger, heavier, and more   [id number].  Regardless of where you are in understanding your identity, speech/voice change is almost   always possible, and even the smallest of changes can make a difference in your life. Don t   avoid or put off your voice if you don t like it! If you are transitioning (to whatever extent), you   need to be willing to be in limbo as you work on it, similar to other parts of transition.   Behavioral voice change usually involves a period of solid practice, experimenting with your   voice in real life, and then letting it settle over time. With help, most people can make changes   that make a difference in their lives in terms of comfort, safety, and success in social and   professional situations.  It is also important to also realize that transgender and non-binary people are at risk for voice   problems, since manipulating your  speech/voice, particularly when not seeking professional   help, may be done in a way that invites tension and overworking of the vocal folds. Additionally,   all speakers, transgender and cisgender, can be affected by overall health or certain health   conditions, such as acid reflux, environmental allergies, asthma, or even a high amount of   stress.  I provide a perceptual-acoustic speech/voice evaluation, including measurements of your exact   habitual pitch and pitch range. I can help you identify your existing communication profile, and   determine what aspects can be changed or utilized more. In training sessions, I use my trained   ear, pitch software, video, and/or a simulated piano keyboard to give you the critical immediate   feedback you need to learn new techniques and put them into practice. As a health   professional, I take a serious approach to vocal health by considering your overall health, vocal   load, and vocal habits, and I sometimes perform videostroboscopy to rule out any vocal fold   problems that may or may not affect training.    Lary Hamilton MM (voice), MA, CCC-SLP   she/her  Certificate of Vocology  [facility]  (adapted from [facility] [place] voice and speech lab

## 2023-01-19 NOTE — LETTER
2023       RE: Kelly Muniz  [address]  [facility] [address]     Dear Colleague,    Thank you for referring your patient, Kelly Muniz, to the [facility] E[facility] [place] at [facility]. Please see a copy of my visit note below.        [facility]   at the [facility]   Otolaryngology Clinic     Patient: Kelly Muniz    MRN: [id number]    : 1995    Age/Gender: 27 year old adult  Date of Service: 2023  Rendering Provider:   Portia No MD     Referring Provider   PCP: No Ref-Primary, Physician  Referring Physician: Referred Self, MD  No address on file  Reason for Consultation   Tracheal shave  History   HISTORY OF PRESENT ILLNESS: Ms. Muniz is a 27 year old adult who presents to us today with tracheal shave.     she presents today for evaluation. she reports:    - comfrotable with voice  - uncomfortable with presence of laryngeal prominence  - large part of dysphoria  - interested for tracheal shave  - knows there is potential for scarring  - doesn't experience keloids  - no sports or activities that would create blows towards the neck  - has consultation for bottom surgery in April  - no surgical history  - bothered by prominence and that it moves up and down    PAST MEDICAL HISTORY: No past medical history on file.    PAST SURGICAL HISTORY: No past surgical history on file.    CURRENT MEDICATIONS:   Current Outpatient Medications:      amoxicillin (AMOXIL) 500 MG capsule, TAKE 1 CAPSULE BY MOUTH THREE TIMES DAILY UNTIL ALL TAKEN, Disp: , Rfl:      diclofenac (VOLTAREN) 75 MG EC tablet, Take 1 tablet (75 mg) by mouth 2 times daily, Disp: 20 tablet, Rfl: 0     estradiol (ESTRACE) 2 MG tablet, , Disp: , Rfl:      HYDROcodone-acetaminophen (NORCO) 5-325 MG tablet, TAKE 1 TABLET BY MOUTH EVERY 6 HOURS AS NEEDED FOR PAIN, Disp: , Rfl:      ibuprofen (ADVIL/MOTRIN) 600 MG tablet, TAKE  1 TABLET BY MOUTH EVERY 6 HOURS AS NEEDED FOR PAIN, Disp: , Rfl:      spironolactone (ALDACTONE) 50 MG tablet, Take 150 mg by mouth daily, Disp: , Rfl:     ALLERGIES: Patient has no known allergies.    SOCIAL HISTORY:    Social History     Socioeconomic History     Marital status: Single     Spouse name: Not on file     Number of children: Not on file     Years of education: Not on file     Highest education level: Not on file   Occupational History     Not on file   Tobacco Use     Smoking status: Never     Smokeless tobacco: Never   Vaping Use     Vaping Use: Never used   Substance and Sexual Activity     Alcohol use: Never     Drug use: Never     Sexual activity: Not Currently   Other Topics Concern     Not on file   Social History Narrative    ** Merged History Encounter **          Social Determinants of Health     Financial Resource Strain: Not on file   Food Insecurity: Not on file   Transportation Needs: Not on file   Physical Activity: Not on file   Stress: Not on file   Social Connections: Not on file   Intimate Partner Violence: Not on file   Housing Stability: Not on file         FAMILY HISTORY:   Family History   Problem Relation Age of Onset     Breast Cancer Mother      Non-contributory for problems with anesthesia    REVIEW OF SYSTEMS:   The patient was asked a 14 point review of systems regarding constitutional symptoms, eye symptoms, ears, nose, mouth, throat symptoms, cardiovascular symptoms, respiratory symptoms, gastrointestinal symptoms, genitourinary symptoms, musculoskeletal symptoms, integumentary symptoms, neurological symptoms, psychiatric symptoms, endocrine symptoms, hematologic/lymphatic symptoms, and allergic/ immunologic symptoms.   The pertinent factors have been included in the HPI and below.  Patient Supplied Answers to Review of Systems  No flowsheet data found.    Physical Examination   The patient underwent a physical examination as described below. The pertinent positive and  negative findings are summarized after the description of the examination.  Constitutional: The patient's developmental and nutritional status was assessed. The patient's voice quality was assessed.  Head and Face: The head and face were inspected for deformities. The sinuses were palpated. The salivary glands were palpated. Facial muscle strength was assessed bilaterally.  Eyes: Extraocular movements and primary gaze alignment were assessed.  Ears, Nose, Mouth and Throat: The ears and nose were examined for deformities. The nasal septum, mucosa, and turbinates were inspected by anterior rhinoscopy. The lips, teeth, and gums were examined for abnormalities. The oral mucosa, tongue, palate, tonsils, lateral and posterior pharynx were inspected for the presence of asymmetry or mucosal lesions.    Neck: The tracheal position was noted, and the neck mass palpated to determine if there were any asymmetries, abnormal neck masses, thyromegally, or thyroid nodules.  Respiratory: The nature of the breathing and chest expansion/symmetry was observed.  Cardiovascular: The patient was examined to determine the presence of any edema or jugular venous distension.  Abdomen: The contour of the abdomen was noted.  Lymphatic: The patient was examined for infraclavicular lymphadenopathy.  Musculoskeletal: The patient was inspected for the presence of skeletal deformities.  Extremities: The extremities were examined for any clubbing or cyanosis.  Skin: The skin was examined for inflammatory or neoplastic conditions.  Neurologic: The patient's orientation, mood, and affect were noted. The cranial nerve  functions were examined.  Other pertinent positive and negative findings on physical examination:      OC/OP: no lesions, uvula midline, soft palate elevates symmetrically   Neck: no lesions, no TH tenderness to palpation     All other physical examination findings were within normal limits and noncontributory.  Procedures   Video  Laryngoscopy with Stroboscopy (CPT 02226)    Preoperative Diagnosis:  Dysphonia and throat symptoms  Postoperative Diagnosis: Dysphonia and throat symptoms  Indication:  Patient has new or persistent dysphonia and throat symptoms.  This requires evaluation by stroboscopy to fully delineate the laryngeal functioning; especially mucosal wave assessment, ultrasharp visualization of lesions on the vocal folds, and overall functioning of the larynx.  Details of Procedure: A 70 degree rigid telescopic laryngoscope or a distal chip flexible scope was used. The lighting was obtained via a light cable connected to a stroboscopic unit. The telescope was inserted either transorally or transnasally until the vocal folds could be visualized. The patient was instructed to sustain the vowel  ee  at a comfortable pitch and loudness as the voice was monitored by a microphone connected to a fundamental frequency tracker. This circuit tracked vocal periodicity, allowing the light to flash in synchrony with the glottal cycles. A setting on the stroboscope was set to change the phase of light strobing with relation to the vocal fundamental frequency, producing an image of 1 to 2 glottal cycles every second. The video images were recorded for analysis. Use of the variable speed, slow and stop scan allowed careful study of pertinent segments of laryngeal function to increase accuracy of clinical assessments of function and dysfunction.  In particular, features of glottal closure, mucosal wave on the vocal fold cover and laryngeal symmetry were analyzed. Lastly, the patient was asked to phonate speech samples and auditory/perceptual evaluation of voice and resonance were performed.  The vocal quality was carefully evaluated for hoarseness, breathiness, loudness, phrase length and intelligibility to determine the source of dysphonia.    Findings:      B. LARYNGOVIDEOSTROBOSCOPY   Anatomic/Physiological Deviations:  RNC, vocal folds normal,  patent subglottis, no lesions  Mucosal wave: Right:  No restriction     Left: No restriction  Bilateral Vocal Fold Vibration: Symmetric  Vocal Process: Right: No restriction    Left:  No restriction  Vocal Fold closure: Complete glottal closure    Complication(s): None  Disposition: Patient tolerated the procedure well          Review of Relevant Clinical Data   I personally reviewed:  Notes:    Radiology:    Pathology:    Procedures:    Labs:  No results found for: TSH  No results found for: NA, CO2, BUN, CREAT, GLUCOSE, PHOS  No results found for: WBC, HGB, HCT, MCV, PLT  No results found for: PT, PTT, INR  No results found for: ALESHA  No components found for: RHEUMATOIDFACTOR,  RF  No results found for: CRP  No components found for: CKTOT, URICACID  No components found for: C3, C4, DSDNAAB, NDNAABIFA  No results found for: MPOAB    Patient reported Quality of Life (QOL) Measures   Patient Supplied Answers To VHI Questionnaire  No flowsheet data found.      Patient Supplied Answers To EAT Questionnaire  No flowsheet data found.      Patient Supplied Answers To CSI Questionnaire  No flowsheet data found.      Patient Supplied Answers to Dyspnea Index Questionnaire:  No flowsheet data found.    Impression & Plan     IMPRESSION: Ms. Muniz is a 27 year old adult who is being seen for the followin. Laryngeal prominence  - bothered by laryngeal prominence as a source of dysphoria  - not bothered by voice too much  - she is interested in tracheal shave  - has a consultation for bottom surgery in April  - scope shows vocal folds are normal, patent subglottis, no lesions  - discussed tracheal shave procedure and risks of surgery including scarring, and trading laryngeal prominence with scar  - discussed timing of bottom surgery and need for 3 months of healing prior to other surgeries, vs doing tracheal shave at the end  - before pictures taken today  Plan  - consider surgery and send mychart to schedule    RETURN  VISIT: for surgery    Thank you for the kind referral and for allowing me to share in the care of Ms. Muniz. If you have any questions, please do not hesitate to contact me.    Scribe Disclosure:  I, Raul Wilkes, am serving as a scribe to document services personally performed by Portia No MD based on data collection and the provider's statements to me.     Portia No MD    Laryngology    [facility]  Department of  Otolaryngology - Head and Neck Surgery  [facility] [place] [address]  Appointment line: [phone]  Fax: [phone]  [email or web address]         Again, thank you for allowing me to participate in the care of your patient.      Sincerely,    Portia No MD

## 2023-01-20 ENCOUNTER — PREP FOR PROCEDURE (OUTPATIENT)
Dept: OTOLARYNGOLOGY | Facility: CLINIC | Age: 28
End: 2023-01-20
Payer: COMMERCIAL

## 2023-01-20 DIAGNOSIS — F64.0 GENDER DYSPHORIA IN ADULT: Primary | ICD-10-CM

## 2023-01-20 RX ORDER — CEFAZOLIN SODIUM 2 G/50ML
2 SOLUTION INTRAVENOUS SEE ADMIN INSTRUCTIONS
Status: CANCELLED | OUTPATIENT
Start: 2023-01-20

## 2023-01-20 RX ORDER — CEFAZOLIN SODIUM 2 G/50ML
2 SOLUTION INTRAVENOUS
Status: CANCELLED | OUTPATIENT
Start: 2023-01-20

## 2023-01-25 PROBLEM — F64.0 GENDER DYSPHORIA IN ADULT: Status: ACTIVE | Noted: 2023-01-25

## 2023-02-01 ENCOUNTER — TELEPHONE (OUTPATIENT)
Dept: OTOLARYNGOLOGY | Facility: CLINIC | Age: 28
End: 2023-02-01
Payer: COMMERCIAL

## 2023-02-01 NOTE — TELEPHONE ENCOUNTER
Spoke with patient to confirm surgical date and schedule preop. They are aware of the DOS and PAC appt. She will reach out with any questions or if she needs to reschedule    Anna C. Schoenecker on 2/1/2023 at 3:05 PM

## 2023-02-02 NOTE — TELEPHONE ENCOUNTER
FUTURE VISIT INFORMATION      SURGERY INFORMATION:    Date: 3/17/23    Location:  or    Surgeon:  Portia No MD    Anesthesia Type:  general    Procedure: Chondrolaryngoplasty and flexible laryngoscopy    Consult: ov 1/19    RECORDS REQUESTED FROM:       Primary Care Provider: [employer]

## 2023-03-07 NOTE — TELEPHONE ENCOUNTER
FUTURE VISIT INFORMATION        SURGERY INFORMATION:    Date: 3/17/23    Location:  or    Surgeon:  Portia No MD    Anesthesia Type:  general    Procedure: Chondrolaryngoplasty and flexible laryngoscopy    Consult: ov 1/19     RECORDS REQUESTED FROM:         Primary Care Provider: [employer]

## 2023-03-08 ENCOUNTER — PATIENT OUTREACH (OUTPATIENT)
Dept: OTOLARYNGOLOGY | Facility: CLINIC | Age: 28
End: 2023-03-08
Payer: COMMERCIAL

## 2023-03-08 NOTE — PROGRESS NOTES
Called patient to discuss upcoming surgery details with the patient. Left a message to call me back but will follow up on MyChart. Margarette Arana RN on 3/8/2023 at 1:53 PM

## 2023-03-13 ENCOUNTER — PRE VISIT (OUTPATIENT)
Dept: SURGERY | Facility: CLINIC | Age: 28
End: 2023-03-13

## 2023-03-14 ENCOUNTER — PRE VISIT (OUTPATIENT)
Dept: SURGERY | Facility: CLINIC | Age: 28
End: 2023-03-14

## 2023-04-06 ENCOUNTER — TELEPHONE (OUTPATIENT)
Dept: PLASTIC SURGERY | Facility: CLINIC | Age: 28
End: 2023-04-06
Payer: COMMERCIAL

## 2023-04-06 NOTE — TELEPHONE ENCOUNTER
Appt reminder call for vaginoplasty consult with Vashti 4/11/23. No answer, LVM and sent mychart.     Hayley Couch

## 2023-04-11 ENCOUNTER — PRE VISIT (OUTPATIENT)
Dept: UROLOGY | Facility: CLINIC | Age: 28
End: 2023-04-11

## 2023-04-11 ENCOUNTER — OFFICE VISIT (OUTPATIENT)
Dept: PLASTIC SURGERY | Facility: CLINIC | Age: 28
End: 2023-04-11
Payer: COMMERCIAL

## 2023-04-11 VITALS
DIASTOLIC BLOOD PRESSURE: 64 MMHG | HEIGHT: 66 IN | SYSTOLIC BLOOD PRESSURE: 121 MMHG | OXYGEN SATURATION: 99 % | WEIGHT: 139 LBS | BODY MASS INDEX: 22.34 KG/M2 | HEART RATE: 104 BPM

## 2023-04-11 DIAGNOSIS — F64.0 GENDER DYSPHORIA IN ADULT: Primary | ICD-10-CM

## 2023-04-11 PROCEDURE — 99205 OFFICE O/P NEW HI 60 MIN: CPT | Performed by: UROLOGY

## 2023-04-11 RX ORDER — PROGESTERONE 100 MG/1
100 CAPSULE ORAL AT BEDTIME
COMMUNITY
Start: 2023-03-14

## 2023-04-11 ASSESSMENT — PAIN SCALES - GENERAL: PAINLEVEL: NO PAIN (0)

## 2023-04-11 NOTE — PROGRESS NOTES
Name: Kelly Muniz     MRN: [id number]   YOB: 1995                 Chief Complaint:   Gender Dysphoria  Consult for gender affirming bottom surgery          History of Present Illness:   Kelly is a 27 year old transgender female seen in consultation for gender dysphoria    Patient transitioned starting about 3 years ago after she  her spouse in 2019.   Preferred pronouns are: she/her  The patient has been on exogenous hormones since: Dec 5, 2021.  In terms of an intimate relationship, the patient has partner/fiance of 3 years.  He is a great support system and very supportive of her plans for surgery.  In terms of fertility, the patient: has good     The patient has obtained 1 letters of support which is adequate, but still needs a second LOS.     The patient has previously undergone no gender surgeries.     Long-term surgical goals for the patient include: Full depth vaginoplasty    The patient is here today expressing interest in full depth vaginoplasty.    The patient has been doing hair removal for almost a year and thinks she has a couple sessions left.         Past Medical History:   psoriasis         Past Surgical History:   No past surgical history on file.         Social History:     Social History     Tobacco Use     Smoking status: Never     Smokeless tobacco: Never   Vaping Use     Vaping status: Never Used   Substance Use Topics     Alcohol use: Never          Family History:     Family History   Problem Relation Age of Onset     Breast Cancer Mother           Allergies:   No Known Allergies         Medications:     Current Outpatient Medications   Medication Sig     amoxicillin (AMOXIL) 500 MG capsule TAKE 1 CAPSULE BY MOUTH THREE TIMES DAILY UNTIL ALL TAKEN     diclofenac (VOLTAREN) 75 MG EC tablet Take 1 tablet (75 mg) by mouth 2 times daily     estradiol valerate (DELESTROGEN) 20 MG/ML injection INJECT 0.3 ML IN THE MUSCLE EVERY WEEK     HYDROcodone-acetaminophen  "(NORCO) 5-325 MG tablet TAKE 1 TABLET BY MOUTH EVERY 6 HOURS AS NEEDED FOR PAIN     ibuprofen (ADVIL/MOTRIN) 600 MG tablet TAKE 1 TABLET BY MOUTH EVERY 6 HOURS AS NEEDED FOR PAIN     progesterone (PROMETRIUM) 100 MG capsule Take 100 mg by mouth At Bedtime     spironolactone (ALDACTONE) 100 MG tablet Take 100 mg by mouth daily     spironolactone (ALDACTONE) 50 MG tablet Take 150 mg by mouth daily     estradiol (ESTRACE) 2 MG tablet  (Patient not taking: Reported on 1/19/2023)     No current facility-administered medications for this visit.             Review of Systems:    ROS: ROS neg other than the symptoms noted above in the HPI.          Physical Exam:   /64   Pulse 104   Ht 1.676 m (5' 6\")   Wt 63 kg (139 lb)   SpO2 99%   BMI 22.44 kg/m    General: age-appropriate in NAD  Resp: no respiratory distress  : uncircumcised. Testicles in place.  Scrotum with some small amount of hair.  Neuro: grossly intact  Motor: excellent strength throughout  Skin: clear of rashes or ecchymoses.           Assessment and Plan:   27 year old transgender female with gender dysphoria    The criteria for genital surgery are specific to the type of surgery being requested.  Criteria for bottom surgery:    1. Persistent, well documented gender dysphoria;  2. Capacity to make a fully informed decision and to consent for treatment;  3. Age of consent (>18 years old)  4. If significant medical or mental health concerns are present, they must be well controlled.  5. 12 continuous months of hormone therapy as appropriate to the patient s gender goals (unless  the patient has a medical contraindication or is otherwise unable or unwilling to take  Hormones).  6. Two letters of support    The aim of hormone therapy prior to gonadectomy is primarily to introduce a period of reversible  estrogen or testosterone suppression, before the patient undergoes irreversible surgical intervention.    I reviewed the steps of orchiectomy. I " reviewed the surgical procedure. I reviewed the risks and benefits including bleeding, infection and irreversible nature of the procedure. The patient would like orchiectomy as part of vaginoplasty.    Hair removal is a requirement prior to full depth vaginoplasty as the genital skin will be placed in the vaginal cavity. Lack of hair removal would lead to complications related to intravaginal hair. This is nearly impossible to remove postoperatively.    She has a persistent, well documented gender dysphoria. She has capacity to make a fully informed decision and to consent for treatment. Her mental health issues are well controlled. She has been on continuous hormones for years. She has 1 letter of support.     The patient meets all of these criteria. We discussed that gender affirmation surgery should be considered permanent. We discussed risks/complications of rectal injury, rectovaginal fistula, bleeding, fluid collection, infection, injury to surrounding structures, flap loss, sensory loss, wound dehiscence, vaginal prolapse, vaginal shrinkage/stenosis, need for lifelong dilation, urinary stream abnormalities, DVT/PE and need for revision surgery.     We discussed the option for minimal depth and full depth. She would like full depth vaginoplasty     We also discussed the need to stop hormones chuckie-procedurally for 2 weeks before and after surgery.     We discussed that transgender vaginoplasty for this patient would include: penectomy, orchiectomy, clitoroplasty, labiaplasty, urethral reconstruction, creation of a vagina, skin graft, colpopexy to suspend the vagina, and scrotectomy.     Nearing completion of hair removal.  Has psoriasis but no lesions near genitals. Not on meds for this.  Then RTC once complete.    Physician Attestation   I, Shadi Kingston MD, saw this patient and agree with the findings and plan of care as documented in the note.      Shadi Kingston MD  Reconstructive Urology     60 minutes  spent by me on the date of the encounter doing chart review, history and exam, documentation and further activities per the note

## 2023-04-11 NOTE — LETTER
4/11/2023       RE: Kelly Muniz  [address]  [facility] [address]       Dear Colleague,    Thank you for referring your patient, Kelly Muniz, to the [facility] [place] at [facility]. Please see a copy of my visit note below.        Name: Kelly Muniz     MRN: [id number]   YOB: 1995                 Chief Complaint:   Gender Dysphoria  Consult for gender affirming bottom surgery          History of Present Illness:   Kelly is a 27 year old transgender female seen in consultation for gender dysphoria    Patient transitioned starting about 3 years ago after she  her spouse in 2019.   Preferred pronouns are: she/her  The patient has been on exogenous hormones since: Dec 5, 2021.  In terms of an intimate relationship, the patient has partner/fiance of 3 years.  He is a great support system and very supportive of her plans for surgery.  In terms of fertility, the patient: has good     The patient has obtained 1 letters of support which is adequate, but still needs a second LOS.     The patient has previously undergone no gender surgeries.     Long-term surgical goals for the patient include: Full depth vaginoplasty    The patient is here today expressing interest in full depth vaginoplasty.    The patient has been doing hair removal for almost a year and thinks she has a couple sessions left.         Past Medical History:   psoriasis         Past Surgical History:   No past surgical history on file.         Social History:     Social History     Tobacco Use    Smoking status: Never    Smokeless tobacco: Never   Vaping Use    Vaping status: Never Used   Substance Use Topics    Alcohol use: Never          Family History:     Family History   Problem Relation Age of Onset    Breast Cancer Mother           Allergies:   No Known Allergies         Medications:     Current Outpatient  "Medications   Medication Sig    amoxicillin (AMOXIL) 500 MG capsule TAKE 1 CAPSULE BY MOUTH THREE TIMES DAILY UNTIL ALL TAKEN    diclofenac (VOLTAREN) 75 MG EC tablet Take 1 tablet (75 mg) by mouth 2 times daily    estradiol valerate (DELESTROGEN) 20 MG/ML injection INJECT 0.3 ML IN THE MUSCLE EVERY WEEK    HYDROcodone-acetaminophen (NORCO) 5-325 MG tablet TAKE 1 TABLET BY MOUTH EVERY 6 HOURS AS NEEDED FOR PAIN    ibuprofen (ADVIL/MOTRIN) 600 MG tablet TAKE 1 TABLET BY MOUTH EVERY 6 HOURS AS NEEDED FOR PAIN    progesterone (PROMETRIUM) 100 MG capsule Take 100 mg by mouth At Bedtime    spironolactone (ALDACTONE) 100 MG tablet Take 100 mg by mouth daily    spironolactone (ALDACTONE) 50 MG tablet Take 150 mg by mouth daily    estradiol (ESTRACE) 2 MG tablet  (Patient not taking: Reported on 1/19/2023)     No current facility-administered medications for this visit.             Review of Systems:    ROS: ROS neg other than the symptoms noted above in the HPI.          Physical Exam:   /64   Pulse 104   Ht 1.676 m (5' 6\")   Wt 63 kg (139 lb)   SpO2 99%   BMI 22.44 kg/m    General: age-appropriate in NAD  Resp: no respiratory distress  : uncircumcised. Testicles in place.  Scrotum with some small amount of hair.  Neuro: grossly intact  Motor: excellent strength throughout  Skin: clear of rashes or ecchymoses.           Assessment and Plan:   27 year old transgender female with gender dysphoria    The criteria for genital surgery are specific to the type of surgery being requested.  Criteria for bottom surgery:    1. Persistent, well documented gender dysphoria;  2. Capacity to make a fully informed decision and to consent for treatment;  3. Age of consent (>18 years old)  4. If significant medical or mental health concerns are present, they must be well controlled.  5. 12 continuous months of hormone therapy as appropriate to the patient s gender goals (unless  the patient has a medical contraindication or is " otherwise unable or unwilling to take  Hormones).  6. Two letters of support    The aim of hormone therapy prior to gonadectomy is primarily to introduce a period of reversible  estrogen or testosterone suppression, before the patient undergoes irreversible surgical intervention.    I reviewed the steps of orchiectomy. I reviewed the surgical procedure. I reviewed the risks and benefits including bleeding, infection and irreversible nature of the procedure. The patient would like orchiectomy as part of vaginoplasty.    Hair removal is a requirement prior to full depth vaginoplasty as the genital skin will be placed in the vaginal cavity. Lack of hair removal would lead to complications related to intravaginal hair. This is nearly impossible to remove postoperatively.    She has a persistent, well documented gender dysphoria. She has capacity to make a fully informed decision and to consent for treatment. Her mental health issues are well controlled. She has been on continuous hormones for years. She has 1 letter of support.     The patient meets all of these criteria. We discussed that gender affirmation surgery should be considered permanent. We discussed risks/complications of rectal injury, rectovaginal fistula, bleeding, fluid collection, infection, injury to surrounding structures, flap loss, sensory loss, wound dehiscence, vaginal prolapse, vaginal shrinkage/stenosis, need for lifelong dilation, urinary stream abnormalities, DVT/PE and need for revision surgery.     We discussed the option for minimal depth and full depth. She would like full depth vaginoplasty     We also discussed the need to stop hormones chuckie-procedurally for 2 weeks before and after surgery.     We discussed that transgender vaginoplasty for this patient would include: penectomy, orchiectomy, clitoroplasty, labiaplasty, urethral reconstruction, creation of a vagina, skin graft, colpopexy to suspend the vagina, and scrotectomy.     Nearing  completion of hair removal.  Has psoriasis but no lesions near genitals. Not on meds for this.  Then RTC once complete.    Physician Attestation   I, Shadi Kingston MD, saw this patient and agree with the findings and plan of care as documented in the note.           60 minutes spent by me on the date of the encounter doing chart review, history and exam, documentation and further activities per the note          Again, thank you for allowing me to participate in the care of your patient.      Sincerely,    Shadi Kingston MD

## 2023-05-02 ENCOUNTER — DOCUMENTATION ONLY (OUTPATIENT)
Dept: PLASTIC SURGERY | Facility: CLINIC | Age: 28
End: 2023-05-02
Payer: COMMERCIAL

## 2023-05-04 ENCOUNTER — DOCUMENTATION ONLY (OUTPATIENT)
Dept: PLASTIC SURGERY | Facility: CLINIC | Age: 28
End: 2023-05-04
Payer: COMMERCIAL

## 2023-05-04 NOTE — PROGRESS NOTES
[facility] :  Care Coordination Note     SITUATION   Kelly Muniz is a 27 year old adult who is receiving support for:  No chief complaint on file.  .    BACKGROUND     Hayley reviewed pt letter from 4/12/23 via [employer]. No edits needed.     Pt will need 2nd LOS and DA. Writer sent [employer] message to pt regarding these requirements.     ASSESSMENT     Surgery              CGC Assessment  [facility] [place] En[place]  Patient has a therapist: Yes  Name of therapist: Holly Carvajal  Letter of support #1: Received  Letter #1 Date: 04/12/23  Letter of support #2: Requested  Surgery being considered: Yes  Vaginoplasty: Yes  Hair removal completed: No          PLAN          Nursing Interventions:       Follow-up plan:    1. Obtain 2nd LOS and DA  2. Complete hair removal       Hayley Couch

## 2023-07-25 ENCOUNTER — DOCUMENTATION ONLY (OUTPATIENT)
Dept: PLASTIC SURGERY | Facility: CLINIC | Age: 28
End: 2023-07-25
Payer: COMMERCIAL

## 2023-07-25 NOTE — PROGRESS NOTES
"[facility] :  Care Coordination Note     SITUATION   Kelly Muniz (she/her) is a 27 year old adult who is receiving support for:  Care Team  .    BACKGROUND     First LOS and DA was received in file titled \"updated letter for bottom surgery\". LOS and DA meet requirements. Pt submitted second LOS titled \"[id number]" was reviewed and meets criteria. Pt is ready for hair removal check once completed with their last session. Writer sent message to [facility] and surgeon to request PA.     ASSESSMENT     Surgery              CGC Assessment  [facility] [place]  Patient has a therapist: Yes  Letter of support #1: Received  Letter of support #2: Received  Letter #2 Date: 06/14/23  Surgery being considered: Yes      PLAN          Nursing Interventions:       Follow-up plan:  Surgeon will submit PA. Pt will be ready for hair removal check. Pt will then be ready to be scheduled for surgery.       DHRUV Gumzan, LGSW  "

## 2023-08-12 ENCOUNTER — HEALTH MAINTENANCE LETTER (OUTPATIENT)
Age: 28
End: 2023-08-12

## 2023-09-05 ENCOUNTER — TELEPHONE (OUTPATIENT)
Dept: UROLOGY | Facility: CLINIC | Age: 28
End: 2023-09-05
Payer: COMMERCIAL

## 2023-09-05 NOTE — CONFIDENTIAL NOTE
Writer called re: pt requested follow up appointment for hair removal check. Pt stated her laser hair specialist stated she is done with hair removal. Pt scheduled for next available on 9/12/23 at 1:40 pm.

## 2023-09-05 NOTE — TELEPHONE ENCOUNTER
[facility] Call Center    Phone Message    May a detailed message be left on voicemail: yes     Reason for Call: Other: Pt is requesting a call back please to schedule a follow up gender care visit. Please advise. Thank you!     Action Taken: Message routed to:  [facility] ([facility] Gender Care    Travel Screening: Not Applicable

## 2023-12-29 ENCOUNTER — TELEPHONE (OUTPATIENT)
Dept: PLASTIC SURGERY | Facility: CLINIC | Age: 28
End: 2023-12-29
Payer: COMMERCIAL

## 2023-12-29 NOTE — CONFIDENTIAL NOTE
Writer called pt back re: voicemail requesting top surgery consult but couldn't LVM. Sent [employer] message.

## 2024-01-08 ENCOUNTER — VIRTUAL VISIT (OUTPATIENT)
Dept: FAMILY MEDICINE | Facility: CLINIC | Age: 29
End: 2024-01-08
Payer: COMMERCIAL

## 2024-01-08 DIAGNOSIS — M25.532 LEFT WRIST PAIN: Primary | ICD-10-CM

## 2024-01-08 PROCEDURE — 99212 OFFICE O/P EST SF 10 MIN: CPT | Mod: 95 | Performed by: FAMILY MEDICINE

## 2024-01-08 NOTE — PROGRESS NOTES
Kelly is a 28 year old who is being evaluated via a billable video visit.      How would you like to obtain your AVS? MyChart  If the video visit is dropped, the invitation should be resent by: Text to cell phone: [phone]  Will anyone else be joining your video visit? No          Assessment & Plan   Problem List Items Addressed This Visit    None  Visit Diagnoses       Left wrist pain    -  Primary           Patient requesting note to allow him to wear wrist brace due to previous injury.  Discussed with patient as previously been evaluated through their provider, would recommend checking back with them for any further recommendations or restrictions.  Patient agreeable to plan                       BART TIJERINA MD  [facility]    Barbi Mendoza is a 28 year old, presenting for the following health issues:  Forms (Pt needs a note for work due to wrist injury. Pt had injury 2 weeks ago and reinjured on 12/29. Pt was originally seen at work by their physicians. )        1/8/2024     1:17 PM   Additional Questions   Roomed by Mari STOVALL CMA       History of Present Illness       Reason for visit:  Letter for work    She eats 0-1 servings of fruits and vegetables daily.She consumes 2 sweetened beverage(s) daily.She exercises with enough effort to increase her heart rate 60 or more minutes per day.  She exercises with enough effort to increase her heart rate 7 days per week.   She is taking medications regularly.               Review of Systems   Constitutional, HEENT, cardiovascular, pulmonary, gi and gu systems are negative, except as otherwise noted.      Objective             Physical Exam   GENERAL: Healthy, alert and no distress  EYES: Eyes grossly normal to inspection.  No discharge or erythema, or obvious scleral/conjunctival abnormalities.  RESP: No audible wheeze, cough, or visible cyanosis.  No visible retractions or increased work of breathing.    SKIN: Visible skin  clear. No significant rash, abnormal pigmentation or lesions.  NEURO: Cranial nerves grossly intact.  Mentation and speech appropriate for age.  PSYCH: Mentation appears normal, affect normal/bright, judgement and insight intact, normal speech and appearance well-groomed.                Video-Visit Details    Type of service:  Video Visit        Originating Location (pt. Location): Home    Distant Location (provider location):  On-site  Platform used for Video Visit: [employer]

## 2024-01-09 ENCOUNTER — TELEPHONE (OUTPATIENT)
Dept: ORTHOPEDICS | Facility: CLINIC | Age: 29
End: 2024-01-09

## 2024-01-09 ENCOUNTER — ANCILLARY PROCEDURE (OUTPATIENT)
Dept: GENERAL RADIOLOGY | Facility: CLINIC | Age: 29
End: 2024-01-09
Attending: PHYSICIAN ASSISTANT
Payer: COMMERCIAL

## 2024-01-09 ENCOUNTER — OFFICE VISIT (OUTPATIENT)
Dept: ORTHOPEDICS | Facility: CLINIC | Age: 29
End: 2024-01-09
Payer: OTHER MISCELLANEOUS

## 2024-01-09 VITALS
DIASTOLIC BLOOD PRESSURE: 60 MMHG | HEIGHT: 66 IN | WEIGHT: 141 LBS | SYSTOLIC BLOOD PRESSURE: 110 MMHG | BODY MASS INDEX: 22.66 KG/M2

## 2024-01-09 DIAGNOSIS — S63.502A SPRAIN OF LEFT WRIST, INITIAL ENCOUNTER: Primary | ICD-10-CM

## 2024-01-09 DIAGNOSIS — S69.92XA LEFT WRIST INJURY: ICD-10-CM

## 2024-01-09 PROCEDURE — 73110 X-RAY EXAM OF WRIST: CPT | Mod: TC | Performed by: RADIOLOGY

## 2024-01-09 PROCEDURE — 99214 OFFICE O/P EST MOD 30 MIN: CPT | Performed by: PHYSICIAN ASSISTANT

## 2024-01-09 NOTE — LETTER
1/9/2024         RE: Kelly Muniz  [address]  [facility] [address]        Dear Colleague,    Thank you for referring your patient, Kelly Muniz, to the [facility] [place]. Please see a copy of my visit note below.    ASSESSMENT & PLAN     Today we discussed the underlying etiology/pathology of patient's   1. Sprain of left wrist, initial encounter      -We discussed the patient has sustained an injury to her left wrist in a work comp setting with overall improvement between original date of injury of 12/23/2023 - 12/29/2023 with acute aggravation again of the same region on 01/02/2024.  - We reviewed her x-rays which showed no bony abnormality with joint spaces maintained.  - We will treat this as a soft tissue injury which largely seems to be related to her distal radial ulnar joint.  Cannot rule out injury to the TFCC as there is some mechanical clicking symptoms and mild tenderness over this region  - Patient will return to work with work restrictions which were outlined on her [employer] workability form and was provided to the patient today.  Work restrictions are for the patient to utilize a cock up wrist brace at all times at work as well as at night.  She may remove it for bathing and skin care for the next 2 weeks.  Patient was prescribed diclofenac 50 mg tablet to be taken 1 tablet every 12 hours orally to decrease inflammation and pain.  - Patient to utilize over-the-counter ice as well as any topical agent for pain relief if needed.  - Patient not to lift anything heavier than 5 pounds with her left wrist and hand while wearing her wrist brace.  No lifting heavier than 30 pounds with her right upper extremity.  - I will reassess her in 2 weeks for repeat assessment.    -Call direct clinic number [[phone]] at any time with questions or concerns in regards to your recent office visit with me.     Jose Alejandro Holt PA-C  [facility]        SUBJECTIVE  Kelly RODRIGUEZ Muniz is a/an 28 year old adult who is seen as a self referral for evaluation of left wrist pain. The patient is seen by themselves.  This is a WORK COMP. INJURY/CLAIM  Date of injury: 12/22/23  Employer: [employer]  Is patient employed/working at any other location?: NO  Has patient ever had injury or pain to current body part being evaluated today?  NO    Expanded HPI: Patient had a new work injury to her left wrist dated 12/22/2023.  Patient was in the process of lifting up a 60 pound piece of luggage to assist with transport.  She did not have any pain immediately at that moment but noticed over the next 24-36 hours that she was developing left wrist pain.  Patient is left-hand dominant.  Patient subsequently was seen at [employer] work comp office and it was provided light duty restrictions, patient worked with a left wrist brace on and continued with conservative treatment including ice and felt almost 100% improved by date of 12/29/2023.  Patient was returned to full duties with no wrist brace protection and had acute injury again of the left wrist on 1/2/2024.  Patient has continued to work without restrictions with continued pain that presents in the same manner as her original injury on 12/22/2023.  She notices some slight clicking in the wrist.  Pain is largely over the dorsal aspect of the wrist and she points to the distal ulnar radial joint with also some mild discomfort over the distal ulna at the area of the TFCC.  No numbness or tingling.  No triggering or locking or catching.  She has decreased grasp and  strength with her left hand.  No use of oral medications.  Patient has continued with ice.  Her employer has not allowed her to utilize a left wrist brace without formal doctors order.      Onset: 12/22/23 original DOI with reinjury on 01/02/24.  Patient describes injury as lifting a heavy bag over the counter and thought it may have twisted weird which  caused pain.   Location of Pain: left wrist - dorsal aspect of wrist and lateral aspect; no radiation of pain; no numbness/tingling  Rating of Pain at worst: 10/10  Rating of Pain Currently: 8/10  Worsened by: lifting heavy bags, flexion/extension of the wrist, grasp/gripping, opening jars  Better with: mild with cock up wrist brace,   Treatments tried: rest/activity avoidance, ice, physical therapy/health central visits (4-5 visits), and wrist brace  Quality: constant aching, dull, occasional sharp, stabbing  Associated symptoms: swelling, weakness of the hand with lifting objects, occasional clicking of the left wrist, and feeling of instability  Orthopedic history: NO  Relevant surgical history: NO  Social history: social history: works at [employer] as     No past medical history on file.  Social History     Socioeconomic History     Marital status: Single   Tobacco Use     Smoking status: Never     Passive exposure: Never     Smokeless tobacco: Never   Vaping Use     Vaping Use: Never used   Substance and Sexual Activity     Alcohol use: Never     Drug use: Never     Sexual activity: Not Currently   Social History Narrative    ** Merged History Encounter **          Social Determinants of Health     Financial Resource Strain: Low Risk  (1/8/2024)    Financial Resource Strain      Within the past 12 months, have you or your family members you live with been unable to get utilities (heat, electricity) when it was really needed?: No   Food Insecurity: Low Risk  (1/8/2024)    Food Insecurity      Within the past 12 months, did you worry that your food would run out before you got money to buy more?: No      Within the past 12 months, did the food you bought just not last and you didn t have money to get more?: No   Transportation Needs: Low Risk  (1/8/2024)    Transportation Needs      Within the past 12 months, has lack of transportation kept you from medical appointments, getting your medicines, non-medical  meetings or appointments, work, or from getting things that you need?: No   Housing Stability: Low Risk  (1/8/2024)    Housing Stability      Do you have housing? : Yes      Are you worried about losing your housing?: No         Patient's past medical, surgical, social, and family histories were personally reviewed today and no changes are noted.    REVIEW OF SYSTEMS:  10 point ROS is negative other than symptoms noted above in HPI, Past Medical History or as stated below  Constitutional: NEGATIVE for fever, chills, change in weight  Skin: NEGATIVE for worrisome rashes, moles or lesions  GI/: NEGATIVE for bowel or bladder changes  Neuro: NEGATIVE for weakness, dizziness or paresthesias    OBJECTIVE:  There were no vitals taken for this visit.   General: healthy, alert and in no distress  HEENT: no scleral icterus or conjunctival erythema  Skin: no suspicious lesions or rash. No jaundice.  Resp: normal respiratory effort without conversational dyspnea   Psych: normal mood and affect        MSK:  Exam shows a pleasant 28-year-old female who presents today full weightbearing without assistive device.  Examination of the left wrist shows no bruising, no swelling or ecchymosis.  Tattoos of the forearm are noted.  Patient is neurovascularly intact including the radial, ulnar and median nerve.  She is nontender to palpation over the medial and lateral epicondyle.  No caryn tenderness through the flexor or extensor muscle bellies of the forearm.  No pain along the entire length of the ulna bony prominences.  No real tenderness noted throughout the length of the bony prominences of the radius.  Patient is tender dorsally at the distal radial ulnar joint.  Negative Finklestein's test.  No pain with passive or active ulnar and radial deviation of the wrist.  Negative grind test of the thumb and the basal joint.  Patient shows slight tenderness over the TFCC structure on the ulnar side of the wrist.  No pain throughout the  remaining carpus dorsal or volar.  Patient is able to make a closed fist and open her hand fully without triggering or locking.  Patient does have decreased  strength as well as opposition strength between the thumb and index finger on the left side compared to the right.  Patient has normal cap refill of distal tissues.  No pain throughout the metacarpals or phalanges of the left hand.  No thenar or hypothenar atrophy.  No open cuts, rashes or lesions.  Patient has full pronation and supination without discomfort.  Patient does have pain with flexion of the wrist which is within normal limits with pain being on the dorsal aspect of the wrist at the distal radial ulnar joint.  Patient also has pain with terminal extension of the wrist with pain being on the dorsal and volar aspect in line with distal radial ulnar joint.        Independent visualization of the below image:  Three-view x-ray of the patient's left wrist are obtained and reviewed showing joint spaces maintained.  No evidence of fracture or dislocation.    Patient's conditions were thoroughly discussed during today's visit with total time spent face-to-face with the patient and documentation being 30 minutes.    Jose Alejandro Holt PA-C  [facility]    This note was completed in part using a voice recognition software, any grammatical or context distortion are unintentional and inherent to the software.       Again, thank you for allowing me to participate in the care of your patient.        Sincerely,        Jose Alejandro Holt PA-C

## 2024-01-09 NOTE — PROGRESS NOTES
ASSESSMENT & PLAN     Today we discussed the underlying etiology/pathology of patient's   1. Sprain of left wrist, initial encounter      -We discussed the patient has sustained an injury to her left wrist in a work comp setting with overall improvement between original date of injury of 12/23/2023 - 12/29/2023 with acute aggravation again of the same region on 01/02/2024.  - We reviewed her x-rays which showed no bony abnormality with joint spaces maintained.  - We will treat this as a soft tissue injury which largely seems to be related to her distal radial ulnar joint.  Cannot rule out injury to the TFCC as there is some mechanical clicking symptoms and mild tenderness over this region  - Patient will return to work with work restrictions which were outlined on her [employer] workability form and was provided to the patient today.  Work restrictions are for the patient to utilize a cock up wrist brace at all times at work as well as at night.  She may remove it for bathing and skin care for the next 2 weeks.  Patient was prescribed diclofenac 50 mg tablet to be taken 1 tablet every 12 hours orally to decrease inflammation and pain.  - Patient to utilize over-the-counter ice as well as any topical agent for pain relief if needed.  - Patient not to lift anything heavier than 5 pounds with her left wrist and hand while wearing her wrist brace.  No lifting heavier than 30 pounds with her right upper extremity.  - I will reassess her in 2 weeks for repeat assessment.    -Call direct clinic number [[phone]] at any time with questions or concerns in regards to your recent office visit with me.     Jose Alejandro Holt PA-C  [facility]        AFUA Muniz is a/an 28 year old adult who is seen as a self referral for evaluation of left wrist pain. The patient is seen by themselves.  This is a WORK COMP. INJURY/CLAIM  Date of injury: 12/22/23  Employer: [employer]  Is patient  employed/working at any other location?: NO  Has patient ever had injury or pain to current body part being evaluated today?  NO    Expanded HPI: Patient had a new work injury to her left wrist dated 12/22/2023.  Patient was in the process of lifting up a 60 pound piece of luggage to assist with transport.  She did not have any pain immediately at that moment but noticed over the next 24-36 hours that she was developing left wrist pain.  Patient is left-hand dominant.  Patient subsequently was seen at [employer] work comp office and it was provided light duty restrictions, patient worked with a left wrist brace on and continued with conservative treatment including ice and felt almost 100% improved by date of 12/29/2023.  Patient was returned to full duties with no wrist brace protection and had acute injury again of the left wrist on 1/2/2024.  Patient has continued to work without restrictions with continued pain that presents in the same manner as her original injury on 12/22/2023.  She notices some slight clicking in the wrist.  Pain is largely over the dorsal aspect of the wrist and she points to the distal ulnar radial joint with also some mild discomfort over the distal ulna at the area of the TFCC.  No numbness or tingling.  No triggering or locking or catching.  She has decreased grasp and  strength with her left hand.  No use of oral medications.  Patient has continued with ice.  Her employer has not allowed her to utilize a left wrist brace without formal doctors order.      Onset: 12/22/23 original DOI with reinjury on 01/02/24.  Patient describes injury as lifting a heavy bag over the counter and thought it may have twisted weird which caused pain.   Location of Pain: left wrist - dorsal aspect of wrist and lateral aspect; no radiation of pain; no numbness/tingling  Rating of Pain at worst: 10/10  Rating of Pain Currently: 8/10  Worsened by: lifting heavy bags, flexion/extension of the wrist,  grasp/gripping, opening jars  Better with: mild with cock up wrist brace,   Treatments tried: rest/activity avoidance, ice, physical therapy/health central visits (4-5 visits), and wrist brace  Quality: constant aching, dull, occasional sharp, stabbing  Associated symptoms: swelling, weakness of the hand with lifting objects, occasional clicking of the left wrist, and feeling of instability  Orthopedic history: NO  Relevant surgical history: NO  Social history: social history: works at [employer] as     No past medical history on file.  Social History     Socioeconomic History    Marital status: Single   Tobacco Use    Smoking status: Never     Passive exposure: Never    Smokeless tobacco: Never   Vaping Use    Vaping Use: Never used   Substance and Sexual Activity    Alcohol use: Never    Drug use: Never    Sexual activity: Not Currently   Social History Narrative    ** Merged History Encounter **          Social Determinants of Health     Financial Resource Strain: Low Risk  (1/8/2024)    Financial Resource Strain     Within the past 12 months, have you or your family members you live with been unable to get utilities (heat, electricity) when it was really needed?: No   Food Insecurity: Low Risk  (1/8/2024)    Food Insecurity     Within the past 12 months, did you worry that your food would run out before you got money to buy more?: No     Within the past 12 months, did the food you bought just not last and you didn t have money to get more?: No   Transportation Needs: Low Risk  (1/8/2024)    Transportation Needs     Within the past 12 months, has lack of transportation kept you from medical appointments, getting your medicines, non-medical meetings or appointments, work, or from getting things that you need?: No   Housing Stability: Low Risk  (1/8/2024)    Housing Stability     Do you have housing? : Yes     Are you worried about losing your housing?: No         Patient's past medical, surgical, social, and  family histories were personally reviewed today and no changes are noted.    REVIEW OF SYSTEMS:  10 point ROS is negative other than symptoms noted above in HPI, Past Medical History or as stated below  Constitutional: NEGATIVE for fever, chills, change in weight  Skin: NEGATIVE for worrisome rashes, moles or lesions  GI/: NEGATIVE for bowel or bladder changes  Neuro: NEGATIVE for weakness, dizziness or paresthesias    OBJECTIVE:  There were no vitals taken for this visit.   General: healthy, alert and in no distress  HEENT: no scleral icterus or conjunctival erythema  Skin: no suspicious lesions or rash. No jaundice.  Resp: normal respiratory effort without conversational dyspnea   Psych: normal mood and affect        MSK:  Exam shows a pleasant 28-year-old female who presents today full weightbearing without assistive device.  Examination of the left wrist shows no bruising, no swelling or ecchymosis.  Tattoos of the forearm are noted.  Patient is neurovascularly intact including the radial, ulnar and median nerve.  She is nontender to palpation over the medial and lateral epicondyle.  No caryn tenderness through the flexor or extensor muscle bellies of the forearm.  No pain along the entire length of the ulna bony prominences.  No real tenderness noted throughout the length of the bony prominences of the radius.  Patient is tender dorsally at the distal radial ulnar joint.  Negative Finklestein's test.  No pain with passive or active ulnar and radial deviation of the wrist.  Negative grind test of the thumb and the basal joint.  Patient shows slight tenderness over the TFCC structure on the ulnar side of the wrist.  No pain throughout the remaining carpus dorsal or volar.  Patient is able to make a closed fist and open her hand fully without triggering or locking.  Patient does have decreased  strength as well as opposition strength between the thumb and index finger on the left side compared to the right.   Patient has normal cap refill of distal tissues.  No pain throughout the metacarpals or phalanges of the left hand.  No thenar or hypothenar atrophy.  No open cuts, rashes or lesions.  Patient has full pronation and supination without discomfort.  Patient does have pain with flexion of the wrist which is within normal limits with pain being on the dorsal aspect of the wrist at the distal radial ulnar joint.  Patient also has pain with terminal extension of the wrist with pain being on the dorsal and volar aspect in line with distal radial ulnar joint.        Independent visualization of the below image:  Three-view x-ray of the patient's left wrist are obtained and reviewed showing joint spaces maintained.  No evidence of fracture or dislocation.    Patient's conditions were thoroughly discussed during today's visit with total time spent face-to-face with the patient and documentation being 30 minutes.    Jose Alejandro Holt PA-C  [facility]    This note was completed in part using a voice recognition software, any grammatical or context distortion are unintentional and inherent to the software.

## 2024-01-09 NOTE — TELEPHONE ENCOUNTER
Reason for Call:  Form, our goal is to have forms completed with 7 days, however, some forms may require a visit or additional information.    Type of letter, form or note:  work comp - Workability form    Who is the form from?: Patient    Where did the form come from: Patient or family brought in       Phone number of person requesting form: [phone]  Can we leave a detailed message on this number:  Not Applicable    Desired completion date of form: asap      How will form be returned?:  fax to [employer] at [phone]    Has the patient signed a consent form for release of information (may be included with form)? Not Applicable    Additional comments: NA    Form was completed and faxed to above fax number on 1/9/24. Copy sent to scan for addition in patient's chart.    Bel Jackson ATC

## 2024-01-09 NOTE — PATIENT INSTRUCTIONS
Today we discussed the underlying etiology/pathology of patient's   1. Sprain of left wrist, initial encounter      -We discussed the patient has sustained an injury to her left wrist in a work comp setting with overall improvement between original date of injury of 12/23/2023 - 12/29/2023 with acute aggravation again of the same region on 01/02/2024.  - We reviewed her x-rays which showed no bony abnormality with joint spaces maintained.  - We will treat this as a soft tissue injury which largely seems to be related to her distal radial ulnar joint.  Cannot rule out injury to the TFCC as there is some mechanical clicking symptoms and mild tenderness over this region  - Patient will return to work with work restrictions which were outlined on her [employer] workability form and was provided to the patient today.  Work restrictions are for the patient to utilize a cock up wrist brace at all times at work as well as at night.  She may remove it for bathing and skin care for the next 2 weeks.  Patient was prescribed diclofenac 50 mg tablet to be taken 1 tablet every 12 hours orally to decrease inflammation and pain.  - Patient to utilize over-the-counter ice as well as any topical agent for pain relief if needed.  - Patient not to lift anything heavier than 5 pounds with her left wrist and hand while wearing her wrist brace.  No lifting heavier than 30 pounds with her right upper extremity.  - I will reassess her in 2 weeks for repeat assessment.    -Call direct clinic number [[phone]] at any time with questions or concerns in regards to your recent office visit with me.     Jose Alejandro Holt PA-C  [facility]

## 2024-01-12 ENCOUNTER — TELEPHONE (OUTPATIENT)
Dept: ORTHOPEDICS | Facility: CLINIC | Age: 29
End: 2024-01-12
Payer: COMMERCIAL

## 2024-01-12 NOTE — CONFIDENTIAL NOTE
Spoke with patient today in regards to how she is doing with her diclofenac and her wrist.  She states she is doing well with no problems.  She will follow-up as scheduled.

## 2024-01-23 ENCOUNTER — OFFICE VISIT (OUTPATIENT)
Dept: PLASTIC SURGERY | Facility: CLINIC | Age: 29
End: 2024-01-23
Payer: COMMERCIAL

## 2024-01-23 VITALS — SYSTOLIC BLOOD PRESSURE: 125 MMHG | OXYGEN SATURATION: 100 % | HEART RATE: 87 BPM | DIASTOLIC BLOOD PRESSURE: 73 MMHG

## 2024-01-23 DIAGNOSIS — F64.0 GENDER DYSPHORIA IN ADULT: Primary | ICD-10-CM

## 2024-01-23 PROCEDURE — 99214 OFFICE O/P EST MOD 30 MIN: CPT | Mod: KX | Performed by: UROLOGY

## 2024-01-23 ASSESSMENT — PAIN SCALES - GENERAL: PAINLEVEL: NO PAIN (0)

## 2024-01-23 NOTE — LETTER
1/23/2024       RE: Kelly Muniz  [address]  [place] [address]       Dear Colleague,    Thank you for referring your patient, Kelly Muniz, to the [facility] [place] at [facility]. Please see a copy of my visit note below.    Comprehensive Gender Care Followup    SUBJECTIVE:  Kelly is a 28 year old transgender female here seeking full depth vaginoplasty. She had her initial consult approximately a year ago and has been working on genital hair removal for vaginoplasty. She is here for a hair check to see if she is ready to move forward with surgery.    OBJECTIVE:  /73 (BP Location: Right arm, Patient Position: Sitting, Cuff Size: Adult Regular)   Pulse 87   SpO2 100%    General: NAD  : well sized uncircumcised phallus.  Scrotum with hair present. I can see improvement.  Testicles in place.    ASSESSMENT/PLAN:  Gender dysphoria  She has a persistent, well documented gender dysphoria. She has capacity to make a fully informed decision and to consent for treatment. Her mental health issues are well controlled. She has been on continuous hormones for years.      The patient meets all of these criteria. We discussed that gender affirmation surgery should be considered permanent. We discussed risks/complications of rectal injury, rectovaginal fistula, bleeding, fluid collection, infection, injury to surrounding structures, flap loss, sensory loss, wound dehiscence, vaginal prolapse, vaginal shrinkage/stenosis, need for lifelong dilation, urinary stream abnormalities, DVT/PE and need for revision surgery.     We discussed the option for minimal depth and full depth.     We also discussed the need to stop hormones chuckie-procedurally for 2 weeks before and after surgery.     We discussed that transgender vaginoplasty for this patient would include: penectomy, orchiectomy, clitoroplasty,  labiaplasty, urethral reconstruction, creation of a vagina, skin graft, colpopexy to suspend the vagina, and scrotectomy.     Needs to have further hair removal then RTC for hair check.      Physician Attestation   I, Shadi Kingston MD, saw this patient and agree with the findings and plan of care as documented in the note.            Again, thank you for allowing me to participate in the care of your patient.      Sincerely,    Shadi Kingston MD

## 2024-01-23 NOTE — PROGRESS NOTES
Comprehensive Gender Care Followup    SUBJECTIVE:  Kelly is a 28 year old transgender female here seeking full depth vaginoplasty. She had her initial consult approximately a year ago and has been working on genital hair removal for vaginoplasty. She is here for a hair check to see if she is ready to move forward with surgery.    OBJECTIVE:  /73 (BP Location: Right arm, Patient Position: Sitting, Cuff Size: Adult Regular)   Pulse 87   SpO2 100%    General: NAD  : well sized uncircumcised phallus.  Scrotum with hair present. I can see improvement.  Testicles in place.    ASSESSMENT/PLAN:  Gender dysphoria  She has a persistent, well documented gender dysphoria. She has capacity to make a fully informed decision and to consent for treatment. Her mental health issues are well controlled. She has been on continuous hormones for years.      The patient meets all of these criteria. We discussed that gender affirmation surgery should be considered permanent. We discussed risks/complications of rectal injury, rectovaginal fistula, bleeding, fluid collection, infection, injury to surrounding structures, flap loss, sensory loss, wound dehiscence, vaginal prolapse, vaginal shrinkage/stenosis, need for lifelong dilation, urinary stream abnormalities, DVT/PE and need for revision surgery.     We discussed the option for minimal depth and full depth.     We also discussed the need to stop hormones chuckie-procedurally for 2 weeks before and after surgery.     We discussed that transgender vaginoplasty for this patient would include: penectomy, orchiectomy, clitoroplasty, labiaplasty, urethral reconstruction, creation of a vagina, skin graft, colpopexy to suspend the vagina, and scrotectomy.     Needs to have further hair removal then RTC for hair check.    CHRISTINA Zurita, CNP    Physician Attestation   I, Shadi Kingston MD, saw this patient and agree with the findings and plan of care as documented in the note.       Shadi Kingston MD  Reconstructive Urology

## 2024-01-23 NOTE — NURSING NOTE
Chief Complaint   Patient presents with    RECHECK     Hair check.       Vitals:    01/23/24 1441   BP: 125/73   BP Location: Right arm   Patient Position: Sitting   Cuff Size: Adult Regular   Pulse: 87   SpO2: 100%       There is no height or weight on file to calculate BMI.    Travon Valadez, EMT

## 2024-01-25 ENCOUNTER — OFFICE VISIT (OUTPATIENT)
Dept: ORTHOPEDICS | Facility: CLINIC | Age: 29
End: 2024-01-25
Payer: OTHER MISCELLANEOUS

## 2024-01-25 VITALS — HEIGHT: 66 IN | WEIGHT: 141 LBS | BODY MASS INDEX: 22.66 KG/M2

## 2024-01-25 DIAGNOSIS — S69.82XD INJURY OF TRIANGULAR FIBROCARTILAGE COMPLEX (TFCC) OF LEFT WRIST, SUBSEQUENT ENCOUNTER: ICD-10-CM

## 2024-01-25 DIAGNOSIS — S63.502A SPRAIN OF LEFT WRIST, INITIAL ENCOUNTER: Primary | ICD-10-CM

## 2024-01-25 PROCEDURE — 99213 OFFICE O/P EST LOW 20 MIN: CPT | Performed by: PHYSICIAN ASSISTANT

## 2024-01-25 NOTE — PATIENT INSTRUCTIONS
Today we discussed the underlying etiology/pathology of patient's   1. Sprain of left wrist, initial encounter    2. 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  - Patient still is wearing her wrist brace as directed but only other treatment is magnesium salt baths  - She continues to have pain over the distal radial ulnar joint and pain over the distal ulna around the styloid and TFCC region  - Patient states that her employer has not allowed her to return to work with work restrictions and she has not worked since her last appointment on 1/9/2024.  - We will send the patient for an MRI of the left wrist to evaluate for TFCC injury or abnormality of the distal radial ulnar joint.  Patient was instructed to make a follow-up appointment to come back and see me in the office 3 days after MRI has been completed to review results and determine further treatment plan  - Patient also will be referred to occupational therapy for left wrist restoration program to decrease pain and improve function in the meantime. Since patient is not working patient only needs to use her wrist brace for any activities that involve lifting.  I would like her to discontinue the brace otherwise to work on restoring range of motion and preventing stiffness.  - Patient was instructed utilize Tylenol up to 3000 mg a day for pain as well as to purchase over-the-counter 1% Voltaren gel to apply it to the wrist once every 8 hours making sure that she does not wash her hands for at least 1 hour after application to minimize washing it off.  -Patient's work restrictions left as they were prior.  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  Copy was made and will be uploaded into epic for record.  -Call direct clinic number [[phone]] at any time with questions or concerns in regards to your recent office visit with me.     Jose Alejandro Holt PA-C  [facility]

## 2024-01-25 NOTE — LETTER
1/25/2024         RE: Kelly Muniz  [address]  [facility] [address]        Dear Colleague,    Thank you for referring your patient, Kelly Muniz, to the [facility] [place]. Please see a copy of my visit note below.    ASSESSMENT & PLAN         Today we discussed the underlying etiology/pathology of patient's   1. Sprain of left wrist, initial encounter    2. 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  - Patient still is wearing her wrist brace as directed but only other treatment is magnesium salt baths  - She continues to have pain over the distal radial ulnar joint and pain over the distal ulna around the styloid and TFCC region  - Patient states that her employer has not allowed her to return to work with work restrictions and she has not worked since her last appointment on 1/9/2024.  - We will send the patient for an MRI of the left wrist to evaluate for TFCC injury or abnormality of the distal radial ulnar joint.  Patient was instructed to make a follow-up appointment to come back and see me in the office 3 days after MRI has been completed to review results and determine further treatment plan  - Patient also will be referred to occupational therapy for left wrist restoration program to decrease pain and improve function in the meantime.  Since patient is not working patient only needs to use her wrist brace for any activities that involve lifting.  I would like her to discontinue the brace otherwise to work on restoring range of motion and preventing stiffness.  - Patient was instructed utilize Tylenol up to 3000 mg a day for pain as well as to  purchase over-the-counter 1% Voltaren gel to apply it to the wrist once every 8 hours making sure that she does not wash her hands for at least 1 hour after application to minimize washing it off.  -Patient's work restrictions left as they were prior.  Form was provided to the patient to give to her HR department.  Copy was made and will be uploaded into epic for record.    -Call direct clinic number [[phone]] at any time with questions or concerns in regards to your recent office visit with me.     Jose Alejandro Holt PA-C  [facility]      SUBJECTIVE  Kelly Muniz is a/an 28 year old adult who is seen for follow up of left wrist pain. The patient is seen by themselves.  This is a WORK COMP. INJURY/CLAIM  Date of injury: 12/22/23  Employer: [employer] - [employer]  Is patient employed/working at any other location?: NO      Since last visit on 1/9/24 patient has mild improvement in pain or function. Patient reports getting sick from the previous medication that was prescribed with the last visit-diclofenac.  Patient has not returned to work since last evaluation as she states her employer does not allow her to return to work with restrictions.      Has previous treatment plan been beneficial for condition: no   Location of Pain: left wrist   Rating of Pain at worst: 8/10  Rating of Pain Currently: 8/10  Worsened by: lifting heavy bags, flexion/extension of the wrist, grasp/gripping, opening jars   Better with: mild with treatments tried, mild with cock up wrist brace  Treatments tried: rest/activity avoidance, ice, and wrist brace, Soak in Magnesium salt  Quality: constant aching, dull, occasional sharp, stabbing   Associated symptoms: reduced swelling, weakness of the hand with lifting objects, occasional clicking of the left wrist, and feeling of instability       No past medical history on file.  Social History     Socioeconomic History     Marital status: Single   Tobacco Use     Smoking  status: Never     Passive exposure: Never     Smokeless tobacco: Never   Vaping Use     Vaping Use: Never used   Substance and Sexual Activity     Alcohol use: Never     Drug use: Never     Sexual activity: Not Currently   Social History Narrative    ** Merged History Encounter **          Social Determinants of Health     Financial Resource Strain: Low Risk  (1/8/2024)    Financial Resource Strain      Within the past 12 months, have you or your family members you live with been unable to get utilities (heat, electricity) when it was really needed?: No   Food Insecurity: Low Risk  (1/8/2024)    Food Insecurity      Within the past 12 months, did you worry that your food would run out before you got money to buy more?: No      Within the past 12 months, did the food you bought just not last and you didn t have money to get more?: No   Transportation Needs: Low Risk  (1/8/2024)    Transportation Needs      Within the past 12 months, has lack of transportation kept you from medical appointments, getting your medicines, non-medical meetings or appointments, work, or from getting things that you need?: No   Housing Stability: Low Risk  (1/8/2024)    Housing Stability      Do you have housing? : Yes      Are you worried about losing your housing?: No         Patient's past medical, surgical, social, and family histories were personally reviewed today and no changes are noted.  REVIEW OF SYSTEMS:  Review of Systems    OBJECTIVE:  There were no vitals taken for this visit.   General: healthy, alert and in no distress  HEENT: no scleral icterus or conjunctival erythema  Skin: no suspicious lesions or rash. No jaundice.  CV: no pedal edema  Resp: normal respiratory effort without conversational dyspnea   Psych: normal mood and affect  Gait: normal steady gait with appropriate coordination and balance  Neuro: Normal light sensory exam of lower extremity      MSK:  Exam shows a 28-year-old who ambulates full weightbearing  without assistive device.  Patient is wearing a cock up wrist Velcro splint on the left upper extremity.  Patient was visualized walking into the office today carrying her purse in her right hand and utilizing her left hand with her cell phone messaging without any evidence of difficulty or visualized pain.  With jacket on wrist brace was not noted on left upper extremity upon entering the building.  Examination of the left upper extremity shows no bruising, no swelling or ecchymosis.  Tattoos are still present.  Patient shows no pain at the level of the elbow including the medial, lateral epicondyles including the insertion of the triceps, olecranon or the anterior cubital fossa/bicep tendon.  No pain throughout the muscle bellies of the forearm including the extensor and flexor muscle bellies.  Mild tenderness is noted on the distal radial ulnar joint.  No pain over the radius itself including the radial styloid process of the first dorsal compartment.  Tenderness is noted around the ulnar styloid as well as the TFCC region.  Circumduction maneuvers of the wrist show mild stiffness but no clicking or mechanical phenomenon.  Patient has increased discomfort with wrist extension.  No pain with passive radial or ulnar deviation.  Patient is grossly neurovascular intact including the ulnar, radial and median nerve.  Patient has full extension of the digits but is unable to make a closed fist with her left hand secondary to some stiffness and voiced pain.  Patient has decreased  strength but again this is difficult to gauge as patient is unable to close her hand into a fist.  Radial pulses +2 and symmetric.  No triggering or locking is noted of any digit.  No pain throughout the meaning hand including the carpus, metacarpals or phalanges.  When asked to utilize her cell phone with her right hand she is able to do so easily.  When asked to utilize her cell phone in her left hand she perceives to have significant  difficulty even holding the phone and is really unable to utilize her digits to activate the screen or swipe.      Patient's conditions were thoroughly discussed during today's visit with total time spent face-to-face with the patient and documentation being 20 minutes.    Jose Alejandro Holt PA-C  [facility]    This note was completed in part using a voice recognition software, any grammatical or context distortion are unintentional and inherent to the software.       Again, thank you for allowing me to participate in the care of your patient.        Sincerely,        Jose Alejandro Hotl PA-C

## 2024-01-31 ENCOUNTER — TRANSFERRED RECORDS (OUTPATIENT)
Dept: HEALTH INFORMATION MANAGEMENT | Facility: CLINIC | Age: 29
End: 2024-01-31
Payer: COMMERCIAL

## 2024-02-12 ENCOUNTER — HOSPITAL ENCOUNTER (OUTPATIENT)
Dept: MRI IMAGING | Facility: CLINIC | Age: 29
Discharge: HOME | End: 2024-02-12
Attending: PHYSICIAN ASSISTANT | Admitting: PHYSICIAN ASSISTANT
Payer: OTHER MISCELLANEOUS

## 2024-02-12 DIAGNOSIS — S63.502A SPRAIN OF LEFT WRIST, INITIAL ENCOUNTER: ICD-10-CM

## 2024-02-12 DIAGNOSIS — S69.82XD INJURY OF TRIANGULAR FIBROCARTILAGE COMPLEX (TFCC) OF LEFT WRIST, SUBSEQUENT ENCOUNTER: ICD-10-CM

## 2024-02-12 PROCEDURE — 73221 MRI JOINT UPR EXTREM W/O DYE: CPT | Mod: LT

## 2024-02-12 NOTE — TELEPHONE ENCOUNTER
Medication Request for:   diclofenac (VOLTAREN) 50 MG EC tablet             Refill request received via fax from patient's pharmacy. Upon chart review this medication was discontinued by Jose Alejandro Holt on 1/25/24  due to patient reporting nausea and light-headedness.     Please deny refill request with pharmacy.     Verena Tipton, ATC

## 2024-02-13 ENCOUNTER — TELEPHONE (OUTPATIENT)
Dept: ORTHOPEDICS | Facility: CLINIC | Age: 29
End: 2024-02-13
Payer: COMMERCIAL

## 2024-02-13 NOTE — TELEPHONE ENCOUNTER
Attempted call to patient to confirm she is aware her appointment for 2/14/24 with Jose Alejandro Holt is in [place] rather than in [place], which is her normal location.     Provided the central scheduling number to call back to confirm/deny info and get rescheduled if she does not want to be seen in [place].     Bel Jackson, ATC

## 2024-02-13 NOTE — PROGRESS NOTES
ASSESSMENT & PLAN             AFUA Muniz is a/an 28 year old female who is seen for follow up of left wrist pain. The patient is seen {sjaparent:611959}.   Since last visit on 1/25/24 patient has ***.      Has previous treatment plan been beneficial for condition: ***   Location of Pain: left wrist -   Rating of Pain at worst: {PAIN SCALE:274070}  Rating of Pain Currently: {PAIN SCALE:260183}  Worsened by: ***  Better with: ***  Treatments tried: {sjatreatmentoptions:233210}, PT ([facility] PT)  Quality: {sjasymptomdescriptor:708615}  Associated symptoms: {thassociatedsx:853970}      No past medical history on file.  Social History     Socioeconomic History    Marital status: Single   Tobacco Use    Smoking status: Never     Passive exposure: Never    Smokeless tobacco: Never   Vaping Use    Vaping Use: Never used   Substance and Sexual Activity    Alcohol use: Never    Drug use: Never    Sexual activity: Not Currently   Social History Narrative    ** Merged History Encounter **          Social Determinants of Health     Financial Resource Strain: Low Risk  (1/8/2024)    Financial Resource Strain     Within the past 12 months, have you or your family members you live with been unable to get utilities (heat, electricity) when it was really needed?: No   Food Insecurity: Low Risk  (1/8/2024)    Food Insecurity     Within the past 12 months, did you worry that your food would run out before you got money to buy more?: No     Within the past 12 months, did the food you bought just not last and you didn t have money to get more?: No   Transportation Needs: Low Risk  (1/8/2024)    Transportation Needs     Within the past 12 months, has lack of transportation kept you from medical appointments, getting your medicines, non-medical meetings or appointments, work, or from getting things that you need?: No   Housing Stability: Low Risk  (1/8/2024)    Housing Stability     Do you have housing? : Yes     Are you worried  about losing your housing?: No         Patient's past medical, surgical, social, and family histories were personally reviewed today {SBDROSEXCEPTIONS:571241}  REVIEW OF SYSTEMS:  Review of Systems    OBJECTIVE:  Vital signs as noted in EPIC for 2/13/2024    General: healthy, alert and in no distress  HEENT: no scleral icterus or conjunctival erythema  Skin: no suspicious lesions or rash. No jaundice.  CV: no pedal edema  Resp: normal respiratory effort without conversational dyspnea   Psych: normal mood and affect  Gait: normal steady gait with appropriate coordination and balance  Neuro: Normal light sensory exam of lower extremity      MSK:  ***        Patient's conditions were thoroughly discussed during today's visit with total time reviewing patient's previous medical records/history/radiology, face-to-face examination and discussion and plan of care with the patient and documentation being *** minutes.    Jose Alejandro Holt PA-C  [facility]    This note was completed in part using a voice recognition software, any grammatical or context distortion are unintentional and inherent to the software.

## 2024-02-13 NOTE — CONFIDENTIAL NOTE
I called the patient today to discuss her wrist MRI.  No answer.  Voicemail left stating we will review her MRI tomorrow.  Her appointment is at 2:00 PM at the [facility] office.  Patient was informed that her appointment is scheduled at [place] and that if she prefers to be seen in [place] where her previous appointments have been she will need to reschedule her appointment to a different day when I am in [place].

## 2024-02-14 ENCOUNTER — OFFICE VISIT (OUTPATIENT)
Dept: ORTHOPEDICS | Facility: CLINIC | Age: 29
End: 2024-02-14
Payer: OTHER MISCELLANEOUS

## 2024-02-14 VITALS — BODY MASS INDEX: 22.66 KG/M2 | HEIGHT: 66 IN | WEIGHT: 141 LBS

## 2024-02-14 DIAGNOSIS — S63.502A SPRAIN OF LEFT WRIST, INITIAL ENCOUNTER: Primary | ICD-10-CM

## 2024-02-14 PROCEDURE — 99213 OFFICE O/P EST LOW 20 MIN: CPT | Performed by: PHYSICIAN ASSISTANT

## 2024-02-14 SDOH — HEALTH STABILITY: PHYSICAL HEALTH: ON AVERAGE, HOW MANY DAYS PER WEEK DO YOU ENGAGE IN MODERATE TO STRENUOUS EXERCISE (LIKE A BRISK WALK)?: 5 DAYS

## 2024-02-14 SDOH — HEALTH STABILITY: PHYSICAL HEALTH: ON AVERAGE, HOW MANY MINUTES DO YOU ENGAGE IN EXERCISE AT THIS LEVEL?: 60 MIN

## 2024-02-14 NOTE — LETTER
REPORT OF WORK COMP    [facility] [address] [address] [phone]      PATIENT DATA    Employee Name: Kelly Muniz      : 1995     SS#: [id number]    Work related injury: Yes  Employer at time of injury: [employer]  Employer contact & phone: Unknown  Employed elsewhere? No  Workers' Compensation Carrier/Managed Care Plan:  Unknown    Today's date: 2024  Date of injury: 23  Date of first visit: 24    PROVIDER EVALUATION: Please fill in as needed.  Please give copy to employee for employer.    1. Diagnosis:   Sprain of left wrist, initial encounter        2. Treatment: OTC tylenol, muscle soaks, and O.T.  3. Medication: Tylenol.  Did not tolerate NSAIDs  NOTE: When ordering a medication, [place] Rules require Work Comp or WC on prescriptions.    4. No work from: N/A  5. Return to work date: 2024     ** WITH RESTRICTIONS? No restrictions      RESTRICTIONS: Unlimited unless listed.  Restrictions apply to home and leisure also.  If work restrictions is not available, the employee is totally disabled.    Maximum Medical Improvement (Date): 2024    Any Permanent Partial Disability? 0%    Provider comments: Patient to discontinue O.T. and agrees they are at [facility] not requesting any further treatment for this injury/condition    Medical Examiner: Jose Alejandro Holt PA-C            License or registration: [place] [id number]    Next appointment: As needed    CC: Employer, Managed Care Plan/Payor, Patient

## 2024-02-14 NOTE — PATIENT INSTRUCTIONS
Today we discussed the underlying etiology/pathology of patient's   1. Sprain of left wrist, resolved      -   -We reviewed patient's MRI of the left wrist which is normal without evidence of TFCC tear or other structural abnormality or inflammation  - Patient has restored full range of motion function of the left wrist and desires to return to work without restrictions  - [employer] paperwork filled out today, copy obtained and will be sent to patient's record as well as workability form with MMI date as of today filled out within epic.  - Patient will follow-up as needed.  -Occupational Therapy may be discontinued at this time.    -Call direct clinic number [[phone]] at any time with questions or concerns in regards to your recent office visit with me.     -Call direct clinic number [[phone]] at any time with questions or concerns in regards to your recent office visit with me.     Jose Alejandro Holt PA-C  [facility]

## 2024-02-14 NOTE — PROGRESS NOTES
ASSESSMENT & PLAN         Today we discussed the underlying etiology/pathology of patient's   1. Sprain of left wrist, resolved      -   -We reviewed patient's MRI of the left wrist which is normal without evidence of TFCC tear or other structural abnormality or inflammation  - Patient has restored full range of motion function of the left wrist and desires to return to work without restrictions  - [employer] paperwork filled out today, copy obtained and will be sent to patient's record as well as workability form with MMI date as of today filled out within epic.  - Patient will follow-up as needed.  -Occupational Therapy may be discontinued at this time.    -Call direct clinic number [[phone]] at any time with questions or concerns in regards to your recent office visit with me.     -Call direct clinic number [[phone]] at any time with questions or concerns in regards to your recent office visit with me.     Jose Alejandro Holt PA-C  [facility]        AFUA Mendoza RODRIGUEZ Muniz is a/an 28 year old female who is seen for follow up of left wrist pain. The patient is seen by themselves.  This is a WORK COMP. 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      Has previous treatment plan been beneficial for condition: yes   Location of Pain: left wrist   Rating of Pain at worst: 0/10  Rating of Pain Currently: 0/10  Worsened by: none  Better with: treatments tried  Treatments tried: physical therapy (2 visits - [facility] PT), wrist brace was discontinued, warm water soak, Ice compress machine  Quality: none  Associated symptoms: no distal numbness or tingling; denies swelling or warmth      No past medical history on file.  Social History     Socioeconomic History    Marital status: Single   Tobacco Use    Smoking status: Never      Passive exposure: Never    Smokeless tobacco: Never   Vaping Use    Vaping Use: Never used   Substance and Sexual Activity    Alcohol use: Never    Drug use: Never    Sexual activity: Not Currently   Social History Narrative    ** Merged History Encounter **          Social Determinants of Health     Financial Resource Strain: Low Risk  (1/8/2024)    Financial Resource Strain     Within the past 12 months, have you or your family members you live with been unable to get utilities (heat, electricity) when it was really needed?: No   Food Insecurity: Low Risk  (1/8/2024)    Food Insecurity     Within the past 12 months, did you worry that your food would run out before you got money to buy more?: No     Within the past 12 months, did the food you bought just not last and you didn t have money to get more?: No   Transportation Needs: Low Risk  (1/8/2024)    Transportation Needs     Within the past 12 months, has lack of transportation kept you from medical appointments, getting your medicines, non-medical meetings or appointments, work, or from getting things that you need?: No   Housing Stability: Low Risk  (1/8/2024)    Housing Stability     Do you have housing? : Yes     Are you worried about losing your housing?: No         Patient's past medical, surgical, social, and family histories were personally reviewed today and no changes are noted.  REVIEW OF SYSTEMS:  Review of Systems    OBJECTIVE:  Vital signs as noted in EPIC for 2/14/2024    General: healthy, alert and in no distress  HEENT: no scleral icterus or conjunctival erythema  Skin: no suspicious lesions or rash. No jaundice.  CV: no pedal edema  Resp: normal respiratory effort without conversational dyspnea   Psych: normal mood and affect  Gait: normal steady gait with appropriate coordination and balance  Neuro: Normal light sensory exam of lower extremity      MSK:  Examination of the left wrist shows no bruising, no swelling and no ecchymosis.  Patient  has symmetric range of motion of the wrist with no pain with circumduction maneuvers or palpation throughout the entire wrist including the distal radius, ulna, distal radial ulnar joint, carpus and hand.   strength is within normal limits.  Patient is neurovascular intact with +2 radial pulses.  No pain over the anatomical snuffbox.      Radiology:  Patient's left wrist MRI personally reviewed and reviewed with the patient today showing no structural abnormality.  Please see radiology report for full details if needed within epic.    Patient's conditions were thoroughly discussed during today's visit with total time reviewing patient's previous medical records/history/radiology, face-to-face examination and discussion and plan of care with the patient and documentation being 20 minutes.    Jose Alejandro Holt PA-C  [facility]    This note was completed in part using a voice recognition software, any grammatical or context distortion are unintentional and inherent to the software.

## 2024-02-14 NOTE — LETTER
2/14/2024         RE: Kelly Muniz  [address]  [facility] [address]        Dear Colleague,    Thank you for referring your patient, Kelly Muniz, to the [facility]. Please see a copy of my visit note below.    ASSESSMENT & PLAN     Today we discussed the underlying etiology/pathology of patient's   1. Sprain of left wrist, initial encounter      -We reviewed patient's MRI of the left wrist which is normal without evidence of TFCC tear or other structural abnormality or inflammation  - Patient has restored full range of motion function of the left wrist and desires to return to work without restrictions  - [employer] paperwork filled out today, copy obtained and will be sent to patient's record as well as workability form with MMI date as of today filled out within epic.  - Patient will follow-up as needed.    -Call direct clinic number [[phone]] at any time with questions or concerns in regards to your recent office visit with me.     Jose Alejandro Holt PA-C  [facility]          SUBJECTIVE  Kelly Muniz is a/an 28 year old female who is seen for follow up of left wrist pain. The patient is seen by themselves.  This is a WORK COMP. 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      Has previous treatment plan been beneficial for condition: yes   Location of Pain: left wrist   Rating of Pain at worst: 0/10  Rating of Pain Currently: 0/10  Worsened by: none  Better with: treatments tried  Treatments tried: physical therapy (2 visits - [facility] PT), wrist brace was discontinued, warm water soak, Ice compress machine  Quality: none  Associated symptoms: no distal numbness or tingling; denies swelling or warmth      No past medical history on file.  Social History      Socioeconomic History     Marital status: Single   Tobacco Use     Smoking status: Never     Passive exposure: Never     Smokeless tobacco: Never   Vaping Use     Vaping Use: Never used   Substance and Sexual Activity     Alcohol use: Never     Drug use: Never     Sexual activity: Not Currently   Social History Narrative    ** Merged History Encounter **          Social Determinants of Health     Financial Resource Strain: Low Risk  (1/8/2024)    Financial Resource Strain      Within the past 12 months, have you or your family members you live with been unable to get utilities (heat, electricity) when it was really needed?: No   Food Insecurity: Low Risk  (1/8/2024)    Food Insecurity      Within the past 12 months, did you worry that your food would run out before you got money to buy more?: No      Within the past 12 months, did the food you bought just not last and you didn t have money to get more?: No   Transportation Needs: Low Risk  (1/8/2024)    Transportation Needs      Within the past 12 months, has lack of transportation kept you from medical appointments, getting your medicines, non-medical meetings or appointments, work, or from getting things that you need?: No   Housing Stability: Low Risk  (1/8/2024)    Housing Stability      Do you have housing? : Yes      Are you worried about losing your housing?: No         Patient's past medical, surgical, social, and family histories were personally reviewed today and no changes are noted.  REVIEW OF SYSTEMS:  Review of Systems    OBJECTIVE:  Vital signs as noted in EPIC for 2/14/2024    General: healthy, alert and in no distress  HEENT: no scleral icterus or conjunctival erythema  Skin: no suspicious lesions or rash. No jaundice.  CV: no pedal edema  Resp: normal respiratory effort without conversational dyspnea   Psych: normal mood and affect  Gait: normal steady gait with appropriate coordination and balance  Neuro: Normal light sensory exam of lower  extremity      MSK:  Examination of the left wrist shows no bruising, no swelling and no ecchymosis.  Patient has symmetric range of motion of the wrist with no pain with circumduction maneuvers or palpation throughout the entire wrist including the distal radius, ulna, distal radial ulnar joint, carpus and hand.   strength is within normal limits.  Patient is neurovascular intact with +2 radial pulses.  No pain over the anatomical snuffbox.      Radiology:  Patient's left wrist MRI personally reviewed and reviewed with the patient today showing no structural abnormality.  Please see radiology report for full details if needed within epic.    Patient's conditions were thoroughly discussed during today's visit with total time reviewing patient's previous medical records/history/radiology, face-to-face examination and discussion and plan of care with the patient and documentation being 20 minutes.    Jose Alejandro Holt PA-C  [facility]    This note was completed in part using a voice recognition software, any grammatical or context distortion are unintentional and inherent to the software.       Again, thank you for allowing me to participate in the care of your patient.        Sincerely,        Jose Alejandro Holt PA-C

## 2024-02-19 ENCOUNTER — TELEPHONE (OUTPATIENT)
Dept: FAMILY MEDICINE | Facility: CLINIC | Age: 29
End: 2024-02-19
Payer: COMMERCIAL

## 2024-02-19 NOTE — TELEPHONE ENCOUNTER
Forms/Letter Request    Type of form/letter: OTHER: jan[employer] claims       Do we have the form/letter: Yes: on Dr Alcides kearns    Who is the form from? Work comp (if other please explain)    Where did/will the form come from? form was faxed in    When is form/letter needed by: when done    How would you like the form/letter returned: Fax : [phone]    Patient Notified form requests are processed in 5-7 business days:Yes    Could we send this information to you in [employer] or would you prefer to receive a phone call?:

## 2024-02-21 ENCOUNTER — TELEPHONE (OUTPATIENT)
Dept: FAMILY MEDICINE | Facility: CLINIC | Age: 29
End: 2024-02-21
Payer: COMMERCIAL

## 2024-02-21 NOTE — TELEPHONE ENCOUNTER
Forms/Letter Request    Type of form/letter: Home Health Certification      Do we have the form/letter: Yes: [phone]    Who is the form from? Home care    Where did/will the form come from? form was faxed in    When is form/letter needed by: WHEN DONE    How would you like the form/letter returned: Fax : [phone]    Patient Notified form requests are processed in 5-7 business days:Yes    Could we send this information to you in [facility] or would you prefer to receive a phone call?:

## 2024-02-26 NOTE — TELEPHONE ENCOUNTER
I am not able to complete this paperwork.   I only saw pt one time 1/8/24, which was a virtual visit.    Per chart review, has been seen by Jose Alejandro Holt PA-C multiple times, would defer to them.

## 2024-02-26 NOTE — TELEPHONE ENCOUNTER
Left message to call back for: Patient  Information to relay to patient: Message below per Dr. Mcgrath.     Mari Leblanc CMA.

## 2024-02-27 ENCOUNTER — TELEPHONE (OUTPATIENT)
Dept: ORTHOPEDICS | Facility: CLINIC | Age: 29
End: 2024-02-27
Payer: COMMERCIAL

## 2024-02-27 NOTE — TELEPHONE ENCOUNTER
Reason for Call:  Form, our goal is to have forms completed with 7 days, however, some forms may require a visit or additional information.    Type of letter, form or note:  work comp     Who is the form from?:  Constantino Guillaume    Where did the form come from: form was faxed in    Phone number of person requesting form: [phone]  Can we leave a detailed message on this number:  YES    Desired completion date of form: 3/8/24      How will form be returned?:  fax to [phone]    Has the patient signed a consent form for release of information (may be included with form)? Not Applicable - work comp    Additional comments: Claim # [id number]    Form was started and place in Provider Basket for provider review/ completion at [facility].     Serene Sharma, ATC

## 2024-03-01 NOTE — TELEPHONE ENCOUNTER
"Left message to call back for: Patient  Information to relay to patient: Message below per Dr. Mcgrath.     \"I am not able to complete this paperwork.   I only saw pt one time 1/8/24, which was a virtual visit.     Per chart review, has been seen by Jose Alejandro Holt PA-C multiple times, would defer to them.\"    Mari Leblanc CMA.       "

## 2024-05-02 ENCOUNTER — DOCUMENTATION ONLY (OUTPATIENT)
Dept: PLASTIC SURGERY | Facility: CLINIC | Age: 29
End: 2024-05-02
Payer: COMMERCIAL

## 2024-05-02 NOTE — PROGRESS NOTES
[facility] :  Care Coordination Note     SITUATION   Kelly Muniz (she/her) is a 28 year old female who is receiving support for:  Care Team  .    BACKGROUND     LOS received and needs some updates. Sent pt LOS provider guide. Updated pt list. Pt will also need a DA. Consult on 6/6.    ASSESSMENT     Surgery              CGC Assessment  Comprehensive Gender Care (CGC) Enrollment: Enrolled  Letter of support #1: Received  Letter #1 Date: 02/08/24  Surgery being considered: Yes  Augmentation: Yes      PLAN          Nursing Interventions:       Follow-up plan:  Pt will upload updated LOS and a DA.       LYNSEY Guzman

## 2024-05-08 ENCOUNTER — TELEPHONE (OUTPATIENT)
Dept: PLASTIC SURGERY | Facility: CLINIC | Age: 29
End: 2024-05-08
Payer: COMMERCIAL

## 2024-05-08 NOTE — TELEPHONE ENCOUNTER
Writer called patient to gain clarification if she would like to move forward with her consult with Dr. Ramírez and to clarify his process. Patient stated she wanted to cancel her consult. Writer plans to message RNCC and surgeon regarding the request to cancel her consult without rescheduling.

## 2024-10-05 ENCOUNTER — HEALTH MAINTENANCE LETTER (OUTPATIENT)
Age: 29
End: 2024-10-05

## 2025-02-05 ENCOUNTER — HOSPITAL ENCOUNTER (OUTPATIENT)
Dept: MAMMOGRAPHY | Facility: CLINIC | Age: 30
Discharge: HOME | End: 2025-02-05
Attending: NURSE PRACTITIONER
Payer: COMMERCIAL

## 2025-02-05 DIAGNOSIS — N63.0 BREAST MASS: ICD-10-CM

## 2025-02-05 PROCEDURE — 76642 ULTRASOUND BREAST LIMITED: CPT | Mod: RT

## 2025-02-12 ENCOUNTER — HOSPITAL ENCOUNTER (OUTPATIENT)
Dept: MAMMOGRAPHY | Facility: CLINIC | Age: 30
Discharge: HOME | End: 2025-02-12
Attending: NURSE PRACTITIONER
Payer: COMMERCIAL

## 2025-02-12 DIAGNOSIS — N63.0 BREAST MASS: ICD-10-CM

## 2025-02-12 PROCEDURE — 250N000009 HC RX 250: Performed by: RADIOLOGY

## 2025-02-12 PROCEDURE — 19083 BX BREAST 1ST LESION US IMAG: CPT | Mod: RT

## 2025-02-12 PROCEDURE — 88305 TISSUE EXAM BY PATHOLOGIST: CPT | Mod: TC | Performed by: RADIOLOGY

## 2025-02-12 RX ADMIN — LIDOCAINE HYDROCHLORIDE 5 ML: 10 INJECTION, SOLUTION INFILTRATION; PERINEURAL at 09:50

## 2025-02-12 NOTE — DISCHARGE INSTRUCTIONS
After Your Breast Biopsy  Bleeding, bruising, and pain  Breast tenderness and some bruising is normal and may last several days. You may wear your bra overnight to support the breast.  You may use an ice pack for pain. Place it over the area for 15 to 20 minutes, several times a day.  You may take over-the-counter pain medicine:  On the day of the biopsy, we recommend Tylenol (acetaminophen) because it does not raise your risk of bleeding.  The next day, you may take an anti-inflammatory medicine (aspirin, ibuprofen, Motrin, Aleve, Advil), unless your doctor tells you not to.  Bandages and showering  Keep your bandage in place until tomorrow morning. Don't get it wet.  If you have small pieces of tape on the skin, leave them in place. They will fall off on their own, or you can remove them after 5 days.  You may shower the next morning after your biopsy.  Activity  No heavy activity (no running, no gym workouts, no lifting, no vacuuming, etc.) on the day of your biopsy.  You may go back to normal activity the next day. But limit what you do if you still have pain or discomfort.  Infection  Infection is rare. Signs of infection include:  Fever (including sweats and chills)  Redness  Pain that gets worse  Fluid draining from the biopsy site  Biopsy results  Results may take up to 5 business days.  A nurse or doctor from the Breast Center will call with your results. We will also send the results to the doctor that ordered your biopsy.  If you have not gotten your results in 5 days, please call the Breast Center.  Call the Breast Center with questions or if:   You have bleeding that lasts more than 20 minutes.  You have pain that you can't control.  You have signs of infection (fever, sweats, chills, redness, increasing pain, or drainage).  After hours, please call the doctor who ordered your biopsy.  For informational purposes only. Not to replace the advice of your health care provider. Copyright   2010 [facility]. All rights reserved. Clinically reviewed by Ambar Lance, Director, [facility]. [employer] [id number] - REV 08/23.

## 2025-02-13 ENCOUNTER — TELEPHONE (OUTPATIENT)
Dept: MAMMOGRAPHY | Facility: CLINIC | Age: 30
End: 2025-02-13
Payer: COMMERCIAL

## 2025-02-13 LAB
PATH REPORT.COMMENTS IMP SPEC: NORMAL
PATH REPORT.FINAL DX SPEC: NORMAL
PATH REPORT.GROSS SPEC: NORMAL
PATH REPORT.MICROSCOPIC SPEC OTHER STN: NORMAL
PATH REPORT.RELEVANT HX SPEC: NORMAL
PHOTO IMAGE: NORMAL

## 2025-02-13 NOTE — TELEPHONE ENCOUNTER
Call placed to Kelly.   verified.     Kelly was notified that pathology results from her 2025 RIGHT BREAST BIOPSY revealed:     [facility]  Kelly Muniz P [id number]  F, 1995  Surgical Pathology Report (Final result) [id number]  Authorizing Provider: Semaj Carreon MD Ordering Provider: Semaj Carreon MD  Ordering Location: [facility]  Collected: 2025 10:01 AM  Pathologist: Karyn Harwdick MD Received: 2025 10:42 AM  .  Specimens  A Breast, Right, Breast Biopsy Needle  .  .  Final Diagnosis  A. 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  -No atypia or malignancy identified.  -See comment  Electronically signed by Karyn Hardwick MD on 2025 at 10:29 AM     Per [facility] Radiologist, Dr. Semaj Carreon , results are concordant with imaging findings.     Recommendation: Clinical Follow-up      Kelly denies any concerns with the biopsy site. Kelly understands that clinical follow-up means that she brings any breast concern up to the attention of her provider.  If this Fibroadenoma grows larger and causes her pain, Kelly understands that she can ask her primary provider for a referral to discuss removal.  Ordering provider was informed of the results and follow up plan.  I encouraged her to contact her doctor with any further breast concerns.  Patient verbalized understanding and agrees with the plan of care.        Carmencita Howell RN BSN  Procedure Nurse  [facility]  [phone]

## 2025-02-20 ENCOUNTER — OFFICE VISIT (OUTPATIENT)
Dept: FAMILY MEDICINE | Facility: CLINIC | Age: 30
End: 2025-02-20
Payer: COMMERCIAL

## 2025-02-20 VITALS
HEART RATE: 110 BPM | OXYGEN SATURATION: 100 % | RESPIRATION RATE: 15 BRPM | TEMPERATURE: 98.5 F | DIASTOLIC BLOOD PRESSURE: 76 MMHG | HEIGHT: 68 IN | WEIGHT: 132.08 LBS | BODY MASS INDEX: 20.02 KG/M2 | SYSTOLIC BLOOD PRESSURE: 120 MMHG

## 2025-02-20 DIAGNOSIS — F32.A DEPRESSION, UNSPECIFIED DEPRESSION TYPE: Primary | ICD-10-CM

## 2025-02-20 DIAGNOSIS — F64.0 GENDER DYSPHORIA IN ADULT: ICD-10-CM

## 2025-02-20 DIAGNOSIS — F41.9 ANXIETY: ICD-10-CM

## 2025-02-20 NOTE — PROGRESS NOTES
OFFICE VISIT    Assessment/Plan:     Patient Instructions:    -Please bring the emotional support animal letter to the appropriate individuals.  -Continue to take care of yourself as you have been.  -Continue to go to therapy.  -Continue to do things to help relieve the depression/anxiety symptoms (such as dancing and exercising).    Please seek immediate medical attention (go to the emergency room or urgent care) for the following reasons: worsening symptoms, or any concerning changes.      Kelly was seen today for establish care and letter request - emotiona support animal (feline) .  Diagnoses and all orders for this visit:    Depression, unspecified depression type  Anxiety  Gender dysphoria in adult: Patient overall feeling stable.  Has resources and constructive methods in place to help address the depression and anxiety symptoms.  The She has currently has been really helpful.  STAN letter was completed, signed, and placed in a sealed envelope (signed over seal) for patient to bring to the appropriate individuals.  A copy was printed and given to patient.        Return if symptoms worsen or fail to improve.  Discussed follow-up for completion of a general physical.    The diagnoses, treatment options, risk, benefits, and recommendations were reviewed with patient/guardian.  Questions were answered to patient's/guardian satisfaction.  Red flag signs were reviewed.  Patient/guardian is in agreement with above plan.      Subjective: 29 year old female with history of gender dysphoria in adult, chronic hepatitis C, chondromalacia (left knee) who presents to clinic for the following complaints:   Patient presents with:  Establish Care  LETTER REQUEST - EMOTIONA SUPPORT ANIMAL (FELINE)     Answers submitted by the patient for this visit:  General Questionnaire (Submitted on 2/20/2025)  Chief Complaint: Chronic problems general questions HPI Form  What is the reason for your visit today? : STAN Recommendation  letter  How many servings of fruits and vegetables do you eat daily?: 4 or more  On average, how many sweetened beverages do you drink each day (Examples: soda, juice, sweet tea, etc.  Do NOT count diet or artificially sweetened beverages)?: 0  How many minutes a day do you exercise enough to make your heart beat faster?: 60 or more  How many days a week do you exercise enough to make your heart beat faster?: 7  How many days per week do you miss taking your medication?: 0  Questionnaire about: Chronic problems general questions HPI Form (Submitted on 2/20/2025)  Chief Complaint: Chronic problems general questions HPI Form      Patient is requesting an emotional support animal letter.  The animal she has is the same cat that she had before that was approved for the STAN.  The cat helps with depression symptoms. Patient doesn't want to take the medications (tried it before and did not like how they made a few zonked). She feels more relaxed and at ease when she has to get around.  The cat knows when patient has the episodes and will help calm down patient. The cat will sit on the patient or lay down with patient to help her. The anxiety also comes down when patient is around the cat. If she feels agitated, the cat calms her down. When symptoms are really bad, she dances or exercises.     She was diagnosed with depression when she lived in [place] in 2017 or 2018. She buried both her parents around the same time and when through a divorce after that. She has siblings who didn't help her a for the funerals. She took care of it by herself.  She moved once and then she moved to [place] without any support.  She has been here for three years.     Patient has a history of gender dysphoria.  She has been working with the plastic surgery team.    Denies any previous history of PTSD, bipolar disorder, or other mental health conditions.    Patient is not on any mental health specific medications at this time.  Patient does have  "a therapist through [facility].    Patient did receive a letter for an emotional support animal and 11/23/2021.      The 10 point review of system is negative except as stated in the HPI.    Allergies were reviewed and updated.    Objective:   /76 (BP Location: Left arm, Patient Position: Sitting, Cuff Size: Adult Regular)   Pulse 110   Temp 98.5  F (36.9  C) (Oral)   Resp 15   Ht 1.734 m (5' 8.25\")   Wt 59.9 kg (132 lb 1.3 oz)   SpO2 100%   BMI 19.94 kg/m    General: Active, alert, nontoxic-appearing.  No acute distress.  HEENT: Normocephalic, atraumatic.  Pupils are equal and round.  Sclera is clear.  Normal external ears. Nares patent.  Moist mucous membranes.    Cardiac: Normal skin tone.    Respiratory/chest: Speaking full sentences.  Breathing is not labored.  No accessory muscle usage.  Extremities: Voluntary movements intact.  Integumentary: Some hypopigmentation of the right upper extremity. Otherwise, no concerning rash or skin changes appreciated.        Christopher Colmenares MD  [facility] [place] [address]  Phone: [phone]  Fax: [phone]    2/20/2025  12:01 PM          Current Outpatient Medications   Medication Sig Dispense Refill    estradiol valerate (DELESTROGEN) 20 MG/ML injection INJECT 0.3 ML IN THE MUSCLE EVERY WEEK      progesterone (PROMETRIUM) 100 MG capsule Take 100 mg by mouth At Bedtime      spironolactone (ALDACTONE) 50 MG tablet Take 150 mg by mouth daily       No current facility-administered medications for this visit.       No Known Allergies    Patient Active Problem List    Diagnosis Date Noted    Gender dysphoria in adult 01/25/2023     Priority: Medium     Added automatically from request for surgery [id number]      Hepatitis C, chronic (H) 01/19/2023     Priority: Medium    Chondromalacia, left knee 08/10/2022     Priority: Medium       Family History   Problem Relation Age of Onset    Breast Cancer Mother        No past surgical " history on file.     Social History     Socioeconomic History    Marital status: Single     Spouse name: Not on file    Number of children: Not on file    Years of education: Not on file    Highest education level: Not on file   Occupational History    Not on file   Tobacco Use    Smoking status: Never     Passive exposure: Never    Smokeless tobacco: Never   Vaping Use    Vaping status: Never Used   Substance and Sexual Activity    Alcohol use: Never    Drug use: Never    Sexual activity: Not Currently   Other Topics Concern    Not on file   Social History Narrative    ** Merged History Encounter **          Social Drivers of Health     Financial Resource Strain: Low Risk  (2/20/2025)    Financial Resource Strain     Within the past 12 months, have you or your family members you live with been unable to get utilities (heat, electricity) when it was really needed?: No   Food Insecurity: Low Risk  (2/20/2025)    Food Insecurity     Within the past 12 months, did you worry that your food would run out before you got money to buy more?: No     Within the past 12 months, did the food you bought just not last and you didn t have money to get more?: No   Transportation Needs: Low Risk  (2/20/2025)    Transportation Needs     Within the past 12 months, has lack of transportation kept you from medical appointments, getting your medicines, non-medical meetings or appointments, work, or from getting things that you need?: No   Physical Activity: Sufficiently Active (2/14/2024)    Exercise Vital Sign     Days of Exercise per Week: 5 days     Minutes of Exercise per Session: 60 min   Stress: Not on file   Social Connections: Not on file   Interpersonal Safety: Low Risk  (2/20/2025)    Interpersonal Safety     Do you feel physically and emotionally safe where you currently live?: Yes     Within the past 12 months, have you been hit, slapped, kicked or otherwise physically hurt by someone?: No     Within the past 12 months, have  you been humiliated or emotionally abused in other ways by your partner or ex-partner?: No   Housing Stability: Low Risk  (2/20/2025)    Housing Stability     Do you have housing? : Yes     Are you worried about losing your housing?: No

## 2025-02-20 NOTE — PATIENT INSTRUCTIONS
-Thank you for choosing the [facility].  -It was a pleasure to see you today.  -Please take a look at the information below for more specific details regarding the treatment plan and recommendations.  -In this after visit summary is a list of your medications and specific instructions.  Please review this carefully as there may be changes made to your medication list.  -If there are any particular questions or concerns, please feel free to reach out to Dr. Colmenares.  -If any labs have been completed, we will reach out to you about results.  If the results are normal or not concerning, a letter or Cryptop[employer]t message will be sent to you.  If any follow-up is needed, either Dr. Colmenares or the nurse will give you a call.  If you have not heard regarding results after 2 weeks, please reach out to the clinic.    Patient Instructions:    -Please bring the emotional support animal letter to the appropriate individuals.  -Continue to take care of yourself as you have been.  -Continue to go to therapy.  -Continue to do things to help relieve the depression/anxiety symptoms (such as dancing and exercising).    Please seek immediate medical attention (go to the emergency room or urgent care) for the following reasons: worsening symptoms, or any concerning changes.      --------------------------------------------------------------------------------------------------------------------    -We are always looking for ways to improve.  You may be selected to receive a survey regarding your visit today.  We encourage you to complete the survey and provide specific, constructive feedback to help us improve our processes.  Thank you for your time!  -Please review the contact information listed on the after visit summary and in the electronic chart.  Below is the phone number that we have on file.  If there are any changes that are needed to be made, please reach out to the clinic.  [phone] (home)

## 2025-02-20 NOTE — LETTER
February 20, 2025      Kelly Muniz  [address]  [facility] [address]      Dear Kelly Muniz and To Whom It May Concern:       Kelly Muniz has been evaluated and due to underlying medical condition(s), patient would benefit from having an Emotional Support Animal (STAN). Please make accommodations for this as appropriate.       Sincerely,           Christopher Colmenares MD  [facility]  2/20/2025  11:47 AM

## 2025-02-25 ENCOUNTER — TELEPHONE (OUTPATIENT)
Dept: FAMILY MEDICINE | Facility: CLINIC | Age: 30
End: 2025-02-25
Payer: COMMERCIAL

## 2025-02-25 NOTE — TELEPHONE ENCOUNTER
Patient Quality Outreach    Patient is due for the following:   Cervical Cancer Screening - PAP Needed  Physical Preventive Adult Physical      Topic Date Due    Pneumococcal Vaccine (1 of 2 - PCV) Never done    Hepatitis A Vaccine (1 of 2 - Risk 2-dose series) Never done    Hepatitis B Vaccine (1 of 3 - 19+ 3-dose series) Never done    Diptheria Tetanus Pertussis (DTAP/TDAP/TD) Vaccine (1 - Tdap) Never done    Flu Vaccine (1) Never done    COVID-19 Vaccine (3 - 2024-25 season) 09/01/2024       Action(s) Taken:   Schedule a office visit for pap smear or  Adult Preventative    Type of outreach:    Sent [employer] message.    Questions for provider review:    None           Corrina Chong MA  Chart routed to Care Team.

## 2025-05-20 ENCOUNTER — OFFICE VISIT (OUTPATIENT)
Dept: FAMILY MEDICINE | Facility: CLINIC | Age: 30
End: 2025-05-20
Payer: COMMERCIAL

## 2025-05-20 VITALS
SYSTOLIC BLOOD PRESSURE: 102 MMHG | HEIGHT: 68 IN | OXYGEN SATURATION: 98 % | WEIGHT: 130 LBS | HEART RATE: 90 BPM | RESPIRATION RATE: 18 BRPM | DIASTOLIC BLOOD PRESSURE: 68 MMHG | TEMPERATURE: 98.6 F | BODY MASS INDEX: 19.7 KG/M2

## 2025-05-20 DIAGNOSIS — F32.A DEPRESSION, UNSPECIFIED DEPRESSION TYPE: Primary | ICD-10-CM

## 2025-05-20 DIAGNOSIS — F41.9 ANXIETY: ICD-10-CM

## 2025-05-20 PROCEDURE — 99214 OFFICE O/P EST MOD 30 MIN: CPT

## 2025-05-20 PROCEDURE — 3078F DIAST BP <80 MM HG: CPT

## 2025-05-20 PROCEDURE — 1126F AMNT PAIN NOTED NONE PRSNT: CPT

## 2025-05-20 PROCEDURE — G2211 COMPLEX E/M VISIT ADD ON: HCPCS

## 2025-05-20 PROCEDURE — 3074F SYST BP LT 130 MM HG: CPT

## 2025-05-20 RX ORDER — ESCITALOPRAM OXALATE 10 MG/1
10 TABLET ORAL DAILY
Qty: 60 TABLET | Refills: 0 | Status: SHIPPED | OUTPATIENT
Start: 2025-05-20

## 2025-05-20 RX ORDER — HYDROXYZINE HYDROCHLORIDE 25 MG/1
12.5-25 TABLET, FILM COATED ORAL 3 TIMES DAILY PRN
Qty: 60 TABLET | Refills: 0 | Status: SHIPPED | OUTPATIENT
Start: 2025-05-20

## 2025-05-20 ASSESSMENT — ANXIETY QUESTIONNAIRES
IF YOU CHECKED OFF ANY PROBLEMS ON THIS QUESTIONNAIRE, HOW DIFFICULT HAVE THESE PROBLEMS MADE IT FOR YOU TO DO YOUR WORK, TAKE CARE OF THINGS AT HOME, OR GET ALONG WITH OTHER PEOPLE: VERY DIFFICULT
2. NOT BEING ABLE TO STOP OR CONTROL WORRYING: NEARLY EVERY DAY
8. IF YOU CHECKED OFF ANY PROBLEMS, HOW DIFFICULT HAVE THESE MADE IT FOR YOU TO DO YOUR WORK, TAKE CARE OF THINGS AT HOME, OR GET ALONG WITH OTHER PEOPLE?: VERY DIFFICULT
GAD7 TOTAL SCORE: 18
4. TROUBLE RELAXING: NEARLY EVERY DAY
3. WORRYING TOO MUCH ABOUT DIFFERENT THINGS: NOT AT ALL
GAD7 TOTAL SCORE: 18
5. BEING SO RESTLESS THAT IT IS HARD TO SIT STILL: NEARLY EVERY DAY
7. FEELING AFRAID AS IF SOMETHING AWFUL MIGHT HAPPEN: NEARLY EVERY DAY
GAD7 TOTAL SCORE: 18
7. FEELING AFRAID AS IF SOMETHING AWFUL MIGHT HAPPEN: NEARLY EVERY DAY
6. BECOMING EASILY ANNOYED OR IRRITABLE: NEARLY EVERY DAY
1. FEELING NERVOUS, ANXIOUS, OR ON EDGE: NEARLY EVERY DAY

## 2025-05-20 ASSESSMENT — PAIN SCALES - GENERAL: PAINLEVEL_OUTOF10: NO PAIN (0)

## 2025-05-20 NOTE — PATIENT INSTRUCTIONS
I am glad that you came in today.  Based on our discussion, I think adding on some medication to manage her anxiety and depression is a good next step.  We will begin by using atarax 12.5-25 mg up to 3 times daily as needed to manage your anxiety, and Lexapro 10 mg once daily to manage depression. I find that Lexapro has a smaller side effect profile than other medications to manage mental health, so I am hopeful that this medication will work well for you.    Some common side effects of these medications include minor GI upset such as nausea or diarrhea, dull headache, and changes in libido.  Many of the side effects oftentimes go away after a number of weeks to months of being on the medication.  If you feel that these side effects are tolerable to you while your body gets used to the medication, I would encourage you to continue on with that.  If you feel that the side effects are something that you are unable to tolerate, I would like you to reach out to me to let me, as we can discuss taking a different medication, or taking you off of the medication.    I would like you to follow-up to establish care with a new primary care provider in 4 weeks.  This will give you the opportunity to discuss how your mental health is doing, and to discuss any possible side effects that the medications are causing you.  Though this may not be the best time to discuss adjustment in your medication dosing, close follow-up will ensure that we are keeping a close eye on how your mental health is doing.  These medications may not take full effect until about 6 to 8 weeks of being on, but you will likely start seeing improvement in your mental health after about 1 to 2 weeks.    These medications can sometimes cause adverse side effects of mental health including suicidal ideation, or worsened anxiety and depression.  If this is the case for you, please reach out immediately for medical attention.    Seek immediate medical attention  with symptoms including chest pain, shortness of breath, nausea and vomiting that prevents you from keeping food down, thunderclap headache, blurry or double vision, changes in hearing, confusion, or acute changes in mental status

## 2025-05-20 NOTE — LETTER
May 20, 2025      Kelly Muniz  [address]  [facility] [place] [address]        To Whom It May Concern:    Kelly Muniz  was seen on 5/20/2025.  This is a formal recommendation from a healthcare profession for an allowance of transfer to a different geographical station due to significant mental impact of current work environment causing mental ehalth decline and distress.        Sincerely,        CHRISTINA Bernstein CNP    Electronically signed

## 2025-05-20 NOTE — LETTER
May 20, 2025      Kelly FRIAS Flavio  [address]  [facility] [address]        To Whom It May Concern:    Kelly Muniz  was seen on 5/20/2025.  Please excuse her from missed time at work until 5/26/2025 due to mental health concerns. She may return without restrictions after 5/26/2025        Sincerely,        CHRISTINA Bernstein CNP    Electronically signed

## 2025-05-20 NOTE — PROGRESS NOTES
Assessment & Plan     (F41.9) Anxiety  (F32.A) Depression, unspecified depression type  (primary encounter diagnosis)  Comment: Chronic and acutely worsening.  No concerns for mental health crisis that would warrant the need for immediate medical attention today.  Patient attest that much of her mental health decline is related to work stress.  She has previously not been on medication for management, and has not seen a therapist for this.  Is interested in both medication management and therapy referral today.  Starting on Atarax as needed to manage acute anxiety concerns, especially to allow for Lexapro to take full effect.  Adding on Lexapro 10 mg daily, and did educate patient that this could take up to 6 to 8 weeks to see full efficacy.  Mental health referral placed today, and primary care follow-up referral placed as well, as patient does not currently have a PCP.  Would like her to follow-up to establish care with a PCP within the next 4 weeks for ongoing evaluation and management of symptoms. Offered education on medications including appropriate dosing, possible side effects, and possible adverse effects.  Education given on return to clinic instructions as well as alarm signs that would require the need for immediate medical attention.  Patient attested to understanding.  Plan: Adult Mental Health  Referral,         hydrOXYzine HCl (ATARAX) 25 MG tablet,         escitalopram (LEXAPRO) 10 MG tablet, PRIMARY         CARE FOLLOW-UP SCHEDULING    This progress note has been dictated, with use of voice recognition software. Any grammatical, typographical, or context errors are unintentional and inherent to use of voice recognition software.     The longitudinal plan of care for the diagnosis(es)/condition(s) as documented were addressed during this visit. Due to the added complexity in care, I will continue to support Violet in the subsequent management and with ongoing continuity of  care.    Prescription drug management  I spent a total of 23 minutes on the day of the visit.   Time spent by me today doing chart review, history and exam, documentation and further activities per the note      Follow-up      Follow-up Visit   Expected date:  Jun 20, 2025 (Approximate)      Follow Up Appointment Details:     Follow-up with whom?: Any Primary Taking New Patients    Follow-Up for what?: Acute Issue Recheck    Additional Details: mental health    How?: In Person               Patient Instructions  I am glad that you came in today.  Based on our discussion, I think adding on some medication to manage her anxiety and depression is a good next step.  We will begin by using atarax 12.5-25 mg up to 3 times daily as needed to manage your anxiety, and Lexapro 10 mg once daily to manage depression. I find that Lexapro has a smaller side effect profile than other medications to manage mental health, so I am hopeful that this medication will work well for you.    Some common side effects of these medications include minor GI upset such as nausea or diarrhea, dull headache, and changes in libido.  Many of the side effects oftentimes go away after a number of weeks to months of being on the medication.  If you feel that these side effects are tolerable to you while your body gets used to the medication, I would encourage you to continue on with that.  If you feel that the side effects are something that you are unable to tolerate, I would like you to reach out to me to let me, as we can discuss taking a different medication, or taking you off of the medication.    I would like you to follow-up to establish care with a new primary care provider in 4 weeks.  This will give you the opportunity to discuss how your mental health is doing, and to discuss any possible side effects that the medications are causing you.  Though this may not be the best time to discuss adjustment in your medication dosing, close follow-up  will ensure that we are keeping a close eye on how your mental health is doing.  These medications may not take full effect until about 6 to 8 weeks of being on, but you will likely start seeing improvement in your mental health after about 1 to 2 weeks.    These medications can sometimes cause adverse side effects of mental health including suicidal ideation, or worsened anxiety and depression.  If this is the case for you, please reach out immediately for medical attention.    Seek immediate medical attention with symptoms including chest pain, shortness of breath, nausea and vomiting that prevents you from keeping food down, thunderclap headache, blurry or double vision, changes in hearing, confusion, or acute changes in mental status    Barbi Mendoza is a 29 year old, presenting for the following health issues:  Consult (Referral for psychiatry and therapy. Feeling burn out, anxiety, stress and fatigue. Her job is causing a great deal of stress.//)        5/20/2025     8:22 AM   Additional Questions   Roomed by Selene FLETCHER     History of Present Illness       Mental Health Follow-up:  Patient presents to follow-up on Depression & Anxiety.Patient's depression since last visit has been:  Worse  The patient is having other symptoms associated with depression.  Patient's anxiety since last visit has been:  Worse  The patient is having other symptoms associated with anxiety.  Any significant life events: job concerns and grief or loss  Patient is feeling anxious or having panic attacks.  Patient has no concerns about alcohol or drug use.    Reason for visit:  Burnout from toxic workplace  Symptom onset:  More than a month  Symptoms include:  Anxiety depression fear anxious  Symptom intensity:  Severe  Symptom progression:  Worsening  Had these symptoms before:  No  What makes it worse:  My job  What makes it better:  Moving to another station She is missing 5 dose(s) of medications per week.  She is not taking  "prescribed medications regularly due to other.          5/20/2025     8:10 AM   BOB-7 SCORE   Total Score 18 (severe anxiety)   Total Score 18        Patient-reported      Kelly is a 29-year-old female with a past medical history significant for anxiety, depression, gender dysphoria, and chronic hepatitis C who presents today with concerns for mental health decline.  Patient reports that she has worked for [employer] for the last 2 years, and has been experiencing a significant amount of hostility in the workplace that has caused a decline to her mental and emotional health.  She has not historically been on medications to manage her mental health, but presents today noting that she would like to try a number of different things to improve her concerns, as her anxiety is becoming out of control.  She works about 5 days a week, and experiences bullying, hostility, and mental distress at work on every shift per her report.  She reports that she has been having increased anxiety attacks, and that her depression seems worse, as she has a very hard time getting up and motivating herself to go to work due to the turmoil that she experiences.  She declines any thoughts of self-harm or suicide.  She has been documenting work experiences for HR purposes, and does have a meeting with management in hopes to transfer to a different geographical location within the [employer] system.  She declines any chest pain, shortness of breath, lightheadedness or dizziness, blurry or double vision, vomiting, severe abdominal pain, diarrhea, or constipation.        Review of Systems  Constitutional, HEENT, cardiovascular, pulmonary, gi and gu systems are negative, except as otherwise noted.      Objective    /68 (BP Location: Left arm, Patient Position: Sitting, Cuff Size: Adult Regular)   Pulse 90   Temp 98.6  F (37  C) (Temporal)   Resp 18   Ht 1.734 m (5' 8.27\")   Wt 59 kg (130 lb)   LMP  (LMP Unknown)   SpO2 98%   BMI " 19.61 kg/m    Body mass index is 19.61 kg/m .  Physical Exam   GENERAL: healthy, alert and no distress  NECK: no adenopathy, no asymmetry, masses, or scars  RESP: Easy rate, depth, and effort of breathing.  No visible use of accessory muscles.  No rapid respiratory rate or increased work of breathing appreciated.  CV: peripheral edema, clubbing, or cyanosis  MS: no gross musculoskeletal defects noted, no edema  NEURO: Normal strength and tone, mentation intact and speech normal  PSYCH: Alert and oriented, and cognitively intact.  Intermittently tearful    Linda Nguyễn DNP FNP-C  Family Nurse Practitioner - Same Day Provider  [facility] [place]        Signed Electronically by: CHRISTINA Bernstein CNP

## 2025-05-20 NOTE — LETTER
May 20, 2025      Kelly Muniz  [address]  [facility] [address]        To Whom It May Concern:    Kelly Muniz  was seen on 5/20/2025.  This is a formal recommendation from a healthcare profession for an allowance of transfer to a different geographical station due to significant mental impact of current work environment causing mental health decline and emotional and mental distress.        Sincerely,        CHRISTINA Bernstein CNP    Electronically signed

## 2025-07-27 DIAGNOSIS — F32.A DEPRESSION, UNSPECIFIED DEPRESSION TYPE: ICD-10-CM

## 2025-07-27 DIAGNOSIS — F41.9 ANXIETY: ICD-10-CM

## 2025-07-28 RX ORDER — ESCITALOPRAM OXALATE 10 MG/1
10 TABLET ORAL DAILY
Qty: 30 TABLET | Refills: 0 | Status: SHIPPED | OUTPATIENT
Start: 2025-07-28

## (undated) RX ORDER — LIDOCAINE HYDROCHLORIDE AND EPINEPHRINE 10; 10 MG/ML; UG/ML
INJECTION, SOLUTION INFILTRATION; PERINEURAL
Status: DISPENSED
Start: 2023-05-03

## (undated) RX ORDER — LIDOCAINE HYDROCHLORIDE 20 MG/ML
SOLUTION OROPHARYNGEAL
Status: DISPENSED
Start: 2023-01-19